# Patient Record
Sex: FEMALE | Race: WHITE | NOT HISPANIC OR LATINO | Employment: UNEMPLOYED | RURAL
[De-identification: names, ages, dates, MRNs, and addresses within clinical notes are randomized per-mention and may not be internally consistent; named-entity substitution may affect disease eponyms.]

---

## 2017-01-03 ENCOUNTER — OUTSIDE FACILITY SERVICE (OUTPATIENT)
Dept: FAMILY MEDICINE CLINIC | Facility: CLINIC | Age: 62
End: 2017-01-03

## 2017-01-03 PROCEDURE — 99308 SBSQ NF CARE LOW MDM 20: CPT | Performed by: FAMILY MEDICINE

## 2017-02-07 ENCOUNTER — OUTSIDE FACILITY SERVICE (OUTPATIENT)
Dept: FAMILY MEDICINE CLINIC | Facility: CLINIC | Age: 62
End: 2017-02-07

## 2017-02-07 PROCEDURE — 99308 SBSQ NF CARE LOW MDM 20: CPT | Performed by: FAMILY MEDICINE

## 2017-03-08 ENCOUNTER — OUTSIDE FACILITY SERVICE (OUTPATIENT)
Dept: FAMILY MEDICINE CLINIC | Facility: CLINIC | Age: 62
End: 2017-03-08

## 2017-03-08 PROCEDURE — 99308 SBSQ NF CARE LOW MDM 20: CPT | Performed by: NURSE PRACTITIONER

## 2017-04-04 ENCOUNTER — OUTSIDE FACILITY SERVICE (OUTPATIENT)
Dept: FAMILY MEDICINE CLINIC | Facility: CLINIC | Age: 62
End: 2017-04-04

## 2017-04-04 PROCEDURE — 99308 SBSQ NF CARE LOW MDM 20: CPT | Performed by: FAMILY MEDICINE

## 2017-05-03 ENCOUNTER — OUTSIDE FACILITY SERVICE (OUTPATIENT)
Dept: FAMILY MEDICINE CLINIC | Facility: CLINIC | Age: 62
End: 2017-05-03

## 2017-05-03 PROCEDURE — 99308 SBSQ NF CARE LOW MDM 20: CPT | Performed by: NURSE PRACTITIONER

## 2017-06-06 ENCOUNTER — OUTSIDE FACILITY SERVICE (OUTPATIENT)
Dept: FAMILY MEDICINE CLINIC | Facility: CLINIC | Age: 62
End: 2017-06-06

## 2017-06-06 PROCEDURE — 99308 SBSQ NF CARE LOW MDM 20: CPT | Performed by: FAMILY MEDICINE

## 2017-07-05 ENCOUNTER — OUTSIDE FACILITY SERVICE (OUTPATIENT)
Dept: FAMILY MEDICINE CLINIC | Facility: CLINIC | Age: 62
End: 2017-07-05

## 2017-07-05 PROCEDURE — 99308 SBSQ NF CARE LOW MDM 20: CPT | Performed by: NURSE PRACTITIONER

## 2017-08-01 ENCOUNTER — OUTSIDE FACILITY SERVICE (OUTPATIENT)
Dept: FAMILY MEDICINE CLINIC | Facility: CLINIC | Age: 62
End: 2017-08-01

## 2017-08-01 PROCEDURE — 99308 SBSQ NF CARE LOW MDM 20: CPT | Performed by: FAMILY MEDICINE

## 2017-09-05 ENCOUNTER — OUTSIDE FACILITY SERVICE (OUTPATIENT)
Dept: FAMILY MEDICINE CLINIC | Facility: CLINIC | Age: 62
End: 2017-09-05

## 2017-09-05 PROCEDURE — 99308 SBSQ NF CARE LOW MDM 20: CPT | Performed by: FAMILY MEDICINE

## 2017-10-04 ENCOUNTER — OUTSIDE FACILITY SERVICE (OUTPATIENT)
Dept: FAMILY MEDICINE CLINIC | Facility: CLINIC | Age: 62
End: 2017-10-04

## 2017-10-04 PROCEDURE — 99308 SBSQ NF CARE LOW MDM 20: CPT | Performed by: NURSE PRACTITIONER

## 2017-11-07 ENCOUNTER — OUTSIDE FACILITY SERVICE (OUTPATIENT)
Dept: FAMILY MEDICINE CLINIC | Facility: CLINIC | Age: 62
End: 2017-11-07

## 2017-11-07 PROCEDURE — 99308 SBSQ NF CARE LOW MDM 20: CPT | Performed by: FAMILY MEDICINE

## 2017-12-06 ENCOUNTER — OUTSIDE FACILITY SERVICE (OUTPATIENT)
Dept: FAMILY MEDICINE CLINIC | Facility: CLINIC | Age: 62
End: 2017-12-06

## 2017-12-06 PROCEDURE — 99308 SBSQ NF CARE LOW MDM 20: CPT | Performed by: NURSE PRACTITIONER

## 2018-01-09 ENCOUNTER — OUTSIDE FACILITY SERVICE (OUTPATIENT)
Dept: FAMILY MEDICINE CLINIC | Facility: CLINIC | Age: 63
End: 2018-01-09

## 2018-01-09 PROCEDURE — 99308 SBSQ NF CARE LOW MDM 20: CPT | Performed by: FAMILY MEDICINE

## 2018-02-07 ENCOUNTER — OUTSIDE FACILITY SERVICE (OUTPATIENT)
Dept: FAMILY MEDICINE CLINIC | Facility: CLINIC | Age: 63
End: 2018-02-07

## 2018-02-07 PROCEDURE — 99308 SBSQ NF CARE LOW MDM 20: CPT | Performed by: NURSE PRACTITIONER

## 2018-03-06 ENCOUNTER — OUTSIDE FACILITY SERVICE (OUTPATIENT)
Dept: FAMILY MEDICINE CLINIC | Facility: CLINIC | Age: 63
End: 2018-03-06

## 2018-03-06 PROCEDURE — 99308 SBSQ NF CARE LOW MDM 20: CPT | Performed by: FAMILY MEDICINE

## 2018-04-04 ENCOUNTER — OUTSIDE FACILITY SERVICE (OUTPATIENT)
Dept: FAMILY MEDICINE CLINIC | Facility: CLINIC | Age: 63
End: 2018-04-04

## 2018-04-04 PROCEDURE — 99308 SBSQ NF CARE LOW MDM 20: CPT | Performed by: NURSE PRACTITIONER

## 2018-05-01 ENCOUNTER — OUTSIDE FACILITY SERVICE (OUTPATIENT)
Dept: FAMILY MEDICINE CLINIC | Facility: CLINIC | Age: 63
End: 2018-05-01

## 2018-05-01 PROCEDURE — 99308 SBSQ NF CARE LOW MDM 20: CPT | Performed by: FAMILY MEDICINE

## 2018-06-05 ENCOUNTER — OUTSIDE FACILITY SERVICE (OUTPATIENT)
Dept: FAMILY MEDICINE CLINIC | Facility: CLINIC | Age: 63
End: 2018-06-05

## 2018-06-05 PROCEDURE — 99308 SBSQ NF CARE LOW MDM 20: CPT | Performed by: FAMILY MEDICINE

## 2018-06-21 ENCOUNTER — TELEPHONE (OUTPATIENT)
Dept: FAMILY MEDICINE CLINIC | Facility: CLINIC | Age: 63
End: 2018-06-21

## 2018-06-21 NOTE — TELEPHONE ENCOUNTER
Delma called stating Mrs Caal had a bug bite on her right upper thigh and is redden, swollen and painful to touch in the center was some pus, she was negative for fever and is has NKA. Requesting orders

## 2018-07-03 ENCOUNTER — OUTSIDE FACILITY SERVICE (OUTPATIENT)
Dept: FAMILY MEDICINE CLINIC | Facility: CLINIC | Age: 63
End: 2018-07-03

## 2018-07-03 PROCEDURE — 99308 SBSQ NF CARE LOW MDM 20: CPT | Performed by: FAMILY MEDICINE

## 2018-08-07 ENCOUNTER — OUTSIDE FACILITY SERVICE (OUTPATIENT)
Dept: FAMILY MEDICINE CLINIC | Facility: CLINIC | Age: 63
End: 2018-08-07

## 2018-08-07 PROCEDURE — 99308 SBSQ NF CARE LOW MDM 20: CPT | Performed by: FAMILY MEDICINE

## 2018-10-02 ENCOUNTER — OUTSIDE FACILITY SERVICE (OUTPATIENT)
Dept: FAMILY MEDICINE CLINIC | Facility: CLINIC | Age: 63
End: 2018-10-02

## 2018-10-02 PROCEDURE — 99308 SBSQ NF CARE LOW MDM 20: CPT | Performed by: FAMILY MEDICINE

## 2018-11-06 ENCOUNTER — OUTSIDE FACILITY SERVICE (OUTPATIENT)
Dept: FAMILY MEDICINE CLINIC | Facility: CLINIC | Age: 63
End: 2018-11-06

## 2018-11-06 PROCEDURE — 99308 SBSQ NF CARE LOW MDM 20: CPT | Performed by: FAMILY MEDICINE

## 2018-12-04 ENCOUNTER — OUTSIDE FACILITY SERVICE (OUTPATIENT)
Dept: FAMILY MEDICINE CLINIC | Facility: CLINIC | Age: 63
End: 2018-12-04

## 2018-12-04 PROCEDURE — 99309 SBSQ NF CARE MODERATE MDM 30: CPT | Performed by: FAMILY MEDICINE

## 2019-01-08 ENCOUNTER — OUTSIDE FACILITY SERVICE (OUTPATIENT)
Dept: FAMILY MEDICINE CLINIC | Facility: CLINIC | Age: 64
End: 2019-01-08

## 2019-01-08 PROCEDURE — 99308 SBSQ NF CARE LOW MDM 20: CPT | Performed by: FAMILY MEDICINE

## 2019-02-05 ENCOUNTER — OUTSIDE FACILITY SERVICE (OUTPATIENT)
Dept: FAMILY MEDICINE CLINIC | Facility: CLINIC | Age: 64
End: 2019-02-05

## 2019-02-05 PROCEDURE — 99308 SBSQ NF CARE LOW MDM 20: CPT | Performed by: FAMILY MEDICINE

## 2019-02-12 ENCOUNTER — OFFICE VISIT (OUTPATIENT)
Dept: SURGERY | Facility: CLINIC | Age: 64
End: 2019-02-12

## 2019-02-12 DIAGNOSIS — K94.20 GASTROSTOMY COMPLICATION (HCC): Primary | ICD-10-CM

## 2019-02-12 PROCEDURE — S0260 H&P FOR SURGERY: HCPCS | Performed by: SURGERY

## 2019-02-12 PROCEDURE — 43762 RPLC GTUBE NO REVJ TRC: CPT | Performed by: SURGERY

## 2019-02-12 NOTE — PROGRESS NOTES
Subjective   Bere Caal is a 63 y.o. female.     History of Present Illness She was sent from the nursing home apparently because she has leakage around her gastrostomy, and it needs changing. It is a balloon type.     The following portions of the patient's history were reviewed and updated as appropriate: current medications, past family history, past medical history, past social history, past surgical history and problem list.    Review of Systems unable to obtain from patient.    Objective   Physical Exam irritation around G tube. The old one was removed and a 26 fr one placed    Assessment/Plan   Bere was seen today for peg replacement.    Diagnoses and all orders for this visit:    Gastrostomy complication (CMS/[facility])    return prn, if this does not help the leakage she would have to have a jejunostomy tube

## 2019-03-05 ENCOUNTER — OUTSIDE FACILITY SERVICE (OUTPATIENT)
Dept: FAMILY MEDICINE CLINIC | Facility: CLINIC | Age: 64
End: 2019-03-05

## 2019-03-05 PROCEDURE — 99308 SBSQ NF CARE LOW MDM 20: CPT | Performed by: FAMILY MEDICINE

## 2019-04-02 ENCOUNTER — OUTSIDE FACILITY SERVICE (OUTPATIENT)
Dept: FAMILY MEDICINE CLINIC | Facility: CLINIC | Age: 64
End: 2019-04-02

## 2019-04-02 PROCEDURE — 99308 SBSQ NF CARE LOW MDM 20: CPT | Performed by: FAMILY MEDICINE

## 2019-05-07 ENCOUNTER — OUTSIDE FACILITY SERVICE (OUTPATIENT)
Dept: FAMILY MEDICINE CLINIC | Facility: CLINIC | Age: 64
End: 2019-05-07

## 2019-05-07 PROCEDURE — 99309 SBSQ NF CARE MODERATE MDM 30: CPT | Performed by: FAMILY MEDICINE

## 2019-06-04 ENCOUNTER — OUTSIDE FACILITY SERVICE (OUTPATIENT)
Dept: FAMILY MEDICINE CLINIC | Facility: CLINIC | Age: 64
End: 2019-06-04

## 2019-06-04 PROCEDURE — 99308 SBSQ NF CARE LOW MDM 20: CPT | Performed by: FAMILY MEDICINE

## 2019-07-09 ENCOUNTER — OUTSIDE FACILITY SERVICE (OUTPATIENT)
Dept: FAMILY MEDICINE CLINIC | Facility: CLINIC | Age: 64
End: 2019-07-09

## 2019-07-09 PROCEDURE — 99308 SBSQ NF CARE LOW MDM 20: CPT | Performed by: FAMILY MEDICINE

## 2019-08-06 ENCOUNTER — OUTSIDE FACILITY SERVICE (OUTPATIENT)
Dept: FAMILY MEDICINE CLINIC | Facility: CLINIC | Age: 64
End: 2019-08-06

## 2019-08-06 PROCEDURE — 99308 SBSQ NF CARE LOW MDM 20: CPT | Performed by: FAMILY MEDICINE

## 2019-10-01 ENCOUNTER — OUTSIDE FACILITY SERVICE (OUTPATIENT)
Dept: FAMILY MEDICINE CLINIC | Facility: CLINIC | Age: 64
End: 2019-10-01

## 2019-10-01 PROCEDURE — 99308 SBSQ NF CARE LOW MDM 20: CPT | Performed by: FAMILY MEDICINE

## 2019-11-05 ENCOUNTER — OUTSIDE FACILITY SERVICE (OUTPATIENT)
Dept: FAMILY MEDICINE CLINIC | Facility: CLINIC | Age: 64
End: 2019-11-05

## 2019-11-05 PROCEDURE — 99308 SBSQ NF CARE LOW MDM 20: CPT | Performed by: FAMILY MEDICINE

## 2019-12-03 ENCOUNTER — OUTSIDE FACILITY SERVICE (OUTPATIENT)
Dept: FAMILY MEDICINE CLINIC | Facility: CLINIC | Age: 64
End: 2019-12-03

## 2019-12-03 PROCEDURE — 99308 SBSQ NF CARE LOW MDM 20: CPT | Performed by: FAMILY MEDICINE

## 2020-01-07 ENCOUNTER — OUTSIDE FACILITY SERVICE (OUTPATIENT)
Dept: FAMILY MEDICINE CLINIC | Facility: CLINIC | Age: 65
End: 2020-01-07

## 2020-01-07 PROCEDURE — 99308 SBSQ NF CARE LOW MDM 20: CPT | Performed by: FAMILY MEDICINE

## 2020-02-04 ENCOUNTER — OUTSIDE FACILITY SERVICE (OUTPATIENT)
Dept: FAMILY MEDICINE CLINIC | Facility: CLINIC | Age: 65
End: 2020-02-04

## 2020-02-04 PROCEDURE — 99308 SBSQ NF CARE LOW MDM 20: CPT | Performed by: FAMILY MEDICINE

## 2020-02-26 ENCOUNTER — TRANSCRIBE ORDERS (OUTPATIENT)
Dept: ADMINISTRATIVE | Facility: HOSPITAL | Age: 65
End: 2020-02-26

## 2020-02-26 DIAGNOSIS — R63.8 INADEQUATE ORAL INTAKE: Primary | ICD-10-CM

## 2020-02-26 DIAGNOSIS — Z91.89 AT RISK FOR INADEQUATE ORAL INTAKE: ICD-10-CM

## 2020-02-26 DIAGNOSIS — R13.19 OTHER DYSPHAGIA: ICD-10-CM

## 2020-03-03 ENCOUNTER — OUTSIDE FACILITY SERVICE (OUTPATIENT)
Dept: FAMILY MEDICINE CLINIC | Facility: CLINIC | Age: 65
End: 2020-03-03

## 2020-03-03 PROCEDURE — 99308 SBSQ NF CARE LOW MDM 20: CPT | Performed by: FAMILY MEDICINE

## 2020-03-05 ENCOUNTER — APPOINTMENT (OUTPATIENT)
Dept: CT IMAGING | Facility: HOSPITAL | Age: 65
End: 2020-03-05

## 2020-03-05 ENCOUNTER — APPOINTMENT (OUTPATIENT)
Dept: GENERAL RADIOLOGY | Facility: HOSPITAL | Age: 65
End: 2020-03-05

## 2020-03-05 ENCOUNTER — HOSPITAL ENCOUNTER (INPATIENT)
Facility: HOSPITAL | Age: 65
LOS: 6 days | Discharge: NURSING, REHAB OR LONG-TERM CARE | End: 2020-03-11
Attending: EMERGENCY MEDICINE | Admitting: HOSPITALIST

## 2020-03-05 DIAGNOSIS — S72.22XA CLOSED DISPLACED SUBTROCHANTERIC FRACTURE OF LEFT FEMUR, INITIAL ENCOUNTER (HCC): Primary | ICD-10-CM

## 2020-03-05 DIAGNOSIS — N30.00 ACUTE CYSTITIS WITHOUT HEMATURIA: ICD-10-CM

## 2020-03-05 LAB
ABO GROUP BLD: NORMAL
ABO GROUP BLD: NORMAL
ALBUMIN SERPL-MCNC: 2.8 G/DL (ref 3.5–5.2)
ALBUMIN/GLOB SERPL: 0.7 G/DL
ALP SERPL-CCNC: 248 U/L (ref 39–117)
ALT SERPL W P-5'-P-CCNC: 47 U/L (ref 1–33)
AMMONIA BLD-SCNC: 75 UMOL/L (ref 11–51)
ANION GAP SERPL CALCULATED.3IONS-SCNC: 12 MMOL/L (ref 5–15)
APAP SERPL-MCNC: <5 MCG/ML (ref 10–30)
AST SERPL-CCNC: 180 U/L (ref 1–32)
BACTERIA UR QL AUTO: ABNORMAL /HPF
BASOPHILS # BLD AUTO: 0.02 10*3/MM3 (ref 0–0.2)
BASOPHILS NFR BLD AUTO: 0.5 % (ref 0–1.5)
BILIRUB SERPL-MCNC: 0.6 MG/DL (ref 0.2–1.2)
BILIRUB UR QL STRIP: ABNORMAL
BLD GP AB SCN SERPL QL: NEGATIVE
BUN BLD-MCNC: 13 MG/DL (ref 8–23)
BUN/CREAT SERPL: 30.2 (ref 7–25)
CALCIUM SPEC-SCNC: 8.7 MG/DL (ref 8.6–10.5)
CHLORIDE SERPL-SCNC: 100 MMOL/L (ref 98–107)
CK SERPL-CCNC: 166 U/L (ref 20–180)
CLARITY UR: ABNORMAL
CO2 SERPL-SCNC: 25 MMOL/L (ref 22–29)
COD CRY URNS QL: ABNORMAL /HPF
COLOR UR: ABNORMAL
CREAT BLD-MCNC: 0.43 MG/DL (ref 0.57–1)
CRP SERPL-MCNC: 7.46 MG/DL (ref 0–0.5)
D-LACTATE SERPL-SCNC: 1.2 MMOL/L (ref 0.5–2)
DEPRECATED RDW RBC AUTO: 55 FL (ref 37–54)
EOSINOPHIL # BLD AUTO: 0.02 10*3/MM3 (ref 0–0.4)
EOSINOPHIL NFR BLD AUTO: 0.5 % (ref 0.3–6.2)
ERYTHROCYTE [DISTWIDTH] IN BLOOD BY AUTOMATED COUNT: 17.7 % (ref 12.3–15.4)
ERYTHROCYTE [SEDIMENTATION RATE] IN BLOOD: 62 MM/HR (ref 0–30)
FLUAV AG NPH QL: NEGATIVE
FLUBV AG NPH QL IA: NEGATIVE
GFR SERPL CREATININE-BSD FRML MDRD: 149 ML/MIN/1.73
GLOBULIN UR ELPH-MCNC: 4.1 GM/DL
GLUCOSE BLD-MCNC: 115 MG/DL (ref 65–99)
GLUCOSE BLDC GLUCOMTR-MCNC: 127 MG/DL (ref 70–130)
GLUCOSE BLDC GLUCOMTR-MCNC: 74 MG/DL (ref 70–130)
GLUCOSE BLDC GLUCOMTR-MCNC: 93 MG/DL (ref 70–130)
GLUCOSE UR STRIP-MCNC: NEGATIVE MG/DL
HAV IGM SERPL QL IA: NORMAL
HBV CORE IGM SERPL QL IA: NORMAL
HBV SURFACE AG SERPL QL IA: NORMAL
HCT VFR BLD AUTO: 30.9 % (ref 34–46.6)
HCT VFR BLD AUTO: 33 % (ref 34–46.6)
HCV AB SER DONR QL: NORMAL
HGB BLD-MCNC: 10.4 G/DL (ref 12–15.9)
HGB BLD-MCNC: 9.7 G/DL (ref 12–15.9)
HGB UR QL STRIP.AUTO: ABNORMAL
HOLD SPECIMEN: NORMAL
HYALINE CASTS UR QL AUTO: ABNORMAL /LPF
IMM GRANULOCYTES # BLD AUTO: 0.02 10*3/MM3 (ref 0–0.05)
IMM GRANULOCYTES NFR BLD AUTO: 0.5 % (ref 0–0.5)
INR PPP: 1.12 (ref 0.9–1.1)
KETONES UR QL STRIP: ABNORMAL
LEUKOCYTE ESTERASE UR QL STRIP.AUTO: ABNORMAL
LIPASE SERPL-CCNC: 35 U/L (ref 13–60)
LYMPHOCYTES # BLD AUTO: 1.06 10*3/MM3 (ref 0.7–3.1)
LYMPHOCYTES NFR BLD AUTO: 24.5 % (ref 19.6–45.3)
MAGNESIUM SERPL-MCNC: 1.6 MG/DL (ref 1.6–2.4)
MCH RBC QN AUTO: 26.5 PG (ref 26.6–33)
MCHC RBC AUTO-ENTMCNC: 31.4 G/DL (ref 31.5–35.7)
MCV RBC AUTO: 84.4 FL (ref 79–97)
MONOCYTES # BLD AUTO: 0.5 10*3/MM3 (ref 0.1–0.9)
MONOCYTES NFR BLD AUTO: 11.6 % (ref 5–12)
NEUTROPHILS # BLD AUTO: 2.7 10*3/MM3 (ref 1.7–7)
NEUTROPHILS NFR BLD AUTO: 62.4 % (ref 42.7–76)
NITRITE UR QL STRIP: POSITIVE
NRBC BLD AUTO-RTO: 0 /100 WBC (ref 0–0.2)
PH UR STRIP.AUTO: 6.5 [PH] (ref 5–8)
PHOSPHATE SERPL-MCNC: 2.8 MG/DL (ref 2.5–4.5)
PLATELET # BLD AUTO: 153 10*3/MM3 (ref 140–450)
PMV BLD AUTO: 10 FL (ref 6–12)
POTASSIUM BLD-SCNC: 4.1 MMOL/L (ref 3.5–5.2)
PROT SERPL-MCNC: 6.9 G/DL (ref 6–8.5)
PROT UR QL STRIP: ABNORMAL
PROTHROMBIN TIME: 15 SECONDS (ref 11–15.4)
RBC # BLD AUTO: 3.66 10*6/MM3 (ref 3.77–5.28)
RBC # UR: ABNORMAL /HPF
REF LAB TEST METHOD: ABNORMAL
RH BLD: POSITIVE
RH BLD: POSITIVE
SODIUM BLD-SCNC: 137 MMOL/L (ref 136–145)
SP GR UR STRIP: 1.02 (ref 1–1.03)
SQUAMOUS #/AREA URNS HPF: ABNORMAL /HPF
T&S EXPIRATION DATE: NORMAL
T4 FREE SERPL-MCNC: 1.56 NG/DL (ref 0.93–1.7)
TROPONIN T SERPL-MCNC: <0.01 NG/ML (ref 0–0.03)
TSH SERPL DL<=0.05 MIU/L-ACNC: 2.15 UIU/ML (ref 0.27–4.2)
UROBILINOGEN UR QL STRIP: ABNORMAL
WBC NRBC COR # BLD: 4.32 10*3/MM3 (ref 3.4–10.8)
WBC UR QL AUTO: ABNORMAL /HPF

## 2020-03-05 PROCEDURE — 86901 BLOOD TYPING SEROLOGIC RH(D): CPT | Performed by: EMERGENCY MEDICINE

## 2020-03-05 PROCEDURE — 82306 VITAMIN D 25 HYDROXY: CPT | Performed by: NURSE PRACTITIONER

## 2020-03-05 PROCEDURE — 25010000002 DIGOXIN PER 500 MCG: Performed by: HOSPITALIST

## 2020-03-05 PROCEDURE — 25010000002 ONDANSETRON PER 1 MG: Performed by: EMERGENCY MEDICINE

## 2020-03-05 PROCEDURE — 25010000002 CEFTRIAXONE: Performed by: EMERGENCY MEDICINE

## 2020-03-05 PROCEDURE — 87086 URINE CULTURE/COLONY COUNT: CPT | Performed by: EMERGENCY MEDICINE

## 2020-03-05 PROCEDURE — 70450 CT HEAD/BRAIN W/O DYE: CPT

## 2020-03-05 PROCEDURE — 80307 DRUG TEST PRSMV CHEM ANLYZR: CPT | Performed by: NURSE PRACTITIONER

## 2020-03-05 PROCEDURE — 80177 DRUG SCRN QUAN LEVETIRACETAM: CPT | Performed by: NURSE PRACTITIONER

## 2020-03-05 PROCEDURE — 92610 EVALUATE SWALLOWING FUNCTION: CPT | Performed by: SPEECH-LANGUAGE PATHOLOGIST

## 2020-03-05 PROCEDURE — 83690 ASSAY OF LIPASE: CPT | Performed by: HOSPITALIST

## 2020-03-05 PROCEDURE — 83735 ASSAY OF MAGNESIUM: CPT | Performed by: EMERGENCY MEDICINE

## 2020-03-05 PROCEDURE — 86901 BLOOD TYPING SEROLOGIC RH(D): CPT

## 2020-03-05 PROCEDURE — 84439 ASSAY OF FREE THYROXINE: CPT | Performed by: EMERGENCY MEDICINE

## 2020-03-05 PROCEDURE — 82746 ASSAY OF FOLIC ACID SERUM: CPT | Performed by: HOSPITALIST

## 2020-03-05 PROCEDURE — 72192 CT PELVIS W/O DYE: CPT

## 2020-03-05 PROCEDURE — 80053 COMPREHEN METABOLIC PANEL: CPT | Performed by: EMERGENCY MEDICINE

## 2020-03-05 PROCEDURE — 25010000002 MORPHINE PER 10 MG: Performed by: EMERGENCY MEDICINE

## 2020-03-05 PROCEDURE — 85652 RBC SED RATE AUTOMATED: CPT | Performed by: EMERGENCY MEDICINE

## 2020-03-05 PROCEDURE — 87088 URINE BACTERIA CULTURE: CPT | Performed by: EMERGENCY MEDICINE

## 2020-03-05 PROCEDURE — 36415 COLL VENOUS BLD VENIPUNCTURE: CPT

## 2020-03-05 PROCEDURE — 87804 INFLUENZA ASSAY W/OPTIC: CPT | Performed by: EMERGENCY MEDICINE

## 2020-03-05 PROCEDURE — 93005 ELECTROCARDIOGRAM TRACING: CPT | Performed by: NURSE PRACTITIONER

## 2020-03-05 PROCEDURE — 25010000002 LEVETRIRACETAM PER 10 MG: Performed by: HOSPITALIST

## 2020-03-05 PROCEDURE — 84443 ASSAY THYROID STIM HORMONE: CPT | Performed by: EMERGENCY MEDICINE

## 2020-03-05 PROCEDURE — 82962 GLUCOSE BLOOD TEST: CPT

## 2020-03-05 PROCEDURE — 25010000003 MAGNESIUM SULFATE 4 GM/100ML SOLUTION: Performed by: HOSPITALIST

## 2020-03-05 PROCEDURE — 83605 ASSAY OF LACTIC ACID: CPT | Performed by: EMERGENCY MEDICINE

## 2020-03-05 PROCEDURE — P9612 CATHETERIZE FOR URINE SPEC: HCPCS

## 2020-03-05 PROCEDURE — 87341 HEP B SURFACE AG NEUTRLZJ IA: CPT | Performed by: NURSE PRACTITIONER

## 2020-03-05 PROCEDURE — 85610 PROTHROMBIN TIME: CPT | Performed by: NURSE PRACTITIONER

## 2020-03-05 PROCEDURE — 86850 RBC ANTIBODY SCREEN: CPT | Performed by: EMERGENCY MEDICINE

## 2020-03-05 PROCEDURE — 73700 CT LOWER EXTREMITY W/O DYE: CPT

## 2020-03-05 PROCEDURE — 82140 ASSAY OF AMMONIA: CPT | Performed by: HOSPITALIST

## 2020-03-05 PROCEDURE — 85018 HEMOGLOBIN: CPT | Performed by: NURSE PRACTITIONER

## 2020-03-05 PROCEDURE — 99284 EMERGENCY DEPT VISIT MOD MDM: CPT

## 2020-03-05 PROCEDURE — 99223 1ST HOSP IP/OBS HIGH 75: CPT | Performed by: NURSE PRACTITIONER

## 2020-03-05 PROCEDURE — 87040 BLOOD CULTURE FOR BACTERIA: CPT | Performed by: EMERGENCY MEDICINE

## 2020-03-05 PROCEDURE — 93010 ELECTROCARDIOGRAM REPORT: CPT | Performed by: INTERNAL MEDICINE

## 2020-03-05 PROCEDURE — 84100 ASSAY OF PHOSPHORUS: CPT | Performed by: EMERGENCY MEDICINE

## 2020-03-05 PROCEDURE — 70450 CT HEAD/BRAIN W/O DYE: CPT | Performed by: RADIOLOGY

## 2020-03-05 PROCEDURE — 81001 URINALYSIS AUTO W/SCOPE: CPT | Performed by: EMERGENCY MEDICINE

## 2020-03-05 PROCEDURE — 85025 COMPLETE CBC W/AUTO DIFF WBC: CPT | Performed by: EMERGENCY MEDICINE

## 2020-03-05 PROCEDURE — 25010000002 MORPHINE PER 10 MG: Performed by: HOSPITALIST

## 2020-03-05 PROCEDURE — 82607 VITAMIN B-12: CPT | Performed by: HOSPITALIST

## 2020-03-05 PROCEDURE — 86706 HEP B SURFACE ANTIBODY: CPT | Performed by: HOSPITALIST

## 2020-03-05 PROCEDURE — 82550 ASSAY OF CK (CPK): CPT | Performed by: EMERGENCY MEDICINE

## 2020-03-05 PROCEDURE — 80074 ACUTE HEPATITIS PANEL: CPT | Performed by: NURSE PRACTITIONER

## 2020-03-05 PROCEDURE — 86900 BLOOD TYPING SEROLOGIC ABO: CPT | Performed by: EMERGENCY MEDICINE

## 2020-03-05 PROCEDURE — 94799 UNLISTED PULMONARY SVC/PX: CPT

## 2020-03-05 PROCEDURE — 84484 ASSAY OF TROPONIN QUANT: CPT | Performed by: EMERGENCY MEDICINE

## 2020-03-05 PROCEDURE — 85014 HEMATOCRIT: CPT | Performed by: NURSE PRACTITIONER

## 2020-03-05 PROCEDURE — 87186 SC STD MICRODIL/AGAR DIL: CPT | Performed by: EMERGENCY MEDICINE

## 2020-03-05 PROCEDURE — 86140 C-REACTIVE PROTEIN: CPT | Performed by: EMERGENCY MEDICINE

## 2020-03-05 PROCEDURE — 86900 BLOOD TYPING SEROLOGIC ABO: CPT

## 2020-03-05 RX ORDER — LEVETIRACETAM 100 MG/ML
250 SOLUTION ORAL EVERY 12 HOURS SCHEDULED
Status: CANCELLED | OUTPATIENT
Start: 2020-03-05

## 2020-03-05 RX ORDER — LEVETIRACETAM 100 MG/ML
250 SOLUTION ORAL 2 TIMES DAILY
COMMUNITY

## 2020-03-05 RX ORDER — TRAZODONE HYDROCHLORIDE 50 MG/1
50 TABLET ORAL NIGHTLY
Status: CANCELLED | OUTPATIENT
Start: 2020-03-05

## 2020-03-05 RX ORDER — MULTIVITAMIN
1 TABLET ORAL DAILY
Status: CANCELLED | OUTPATIENT
Start: 2020-03-05

## 2020-03-05 RX ORDER — CARVEDILOL 6.25 MG/1
6.25 TABLET ORAL EVERY 12 HOURS
Status: CANCELLED | OUTPATIENT
Start: 2020-03-05

## 2020-03-05 RX ORDER — MORPHINE SULFATE 2 MG/ML
1 INJECTION, SOLUTION INTRAMUSCULAR; INTRAVENOUS EVERY 4 HOURS PRN
Status: DISCONTINUED | OUTPATIENT
Start: 2020-03-05 | End: 2020-03-11 | Stop reason: HOSPADM

## 2020-03-05 RX ORDER — LISINOPRIL 10 MG/1
20 TABLET ORAL DAILY
Status: CANCELLED | OUTPATIENT
Start: 2020-03-05

## 2020-03-05 RX ORDER — LACTULOSE 10 G/15ML
300 SOLUTION ORAL ONCE
Status: COMPLETED | OUTPATIENT
Start: 2020-03-05 | End: 2020-03-06

## 2020-03-05 RX ORDER — SODIUM CHLORIDE 9 MG/ML
100 INJECTION, SOLUTION INTRAVENOUS CONTINUOUS
Status: DISCONTINUED | OUTPATIENT
Start: 2020-03-05 | End: 2020-03-05

## 2020-03-05 RX ORDER — PRIMIDONE 250 MG/1
250 TABLET ORAL 3 TIMES DAILY
Status: CANCELLED | OUTPATIENT
Start: 2020-03-05

## 2020-03-05 RX ORDER — PRIMIDONE 250 MG/1
250 TABLET ORAL 3 TIMES DAILY
COMMUNITY

## 2020-03-05 RX ORDER — DILTIAZEM HYDROCHLORIDE 5 MG/ML
5 INJECTION INTRAVENOUS ONCE
Status: COMPLETED | OUTPATIENT
Start: 2020-03-05 | End: 2020-03-05

## 2020-03-05 RX ORDER — PRIMIDONE 250 MG/1
250 TABLET ORAL 3 TIMES DAILY
Status: DISCONTINUED | OUTPATIENT
Start: 2020-03-05 | End: 2020-03-11 | Stop reason: HOSPADM

## 2020-03-05 RX ORDER — NITROGLYCERIN 0.4 MG/1
0.4 TABLET SUBLINGUAL
Status: DISCONTINUED | OUTPATIENT
Start: 2020-03-05 | End: 2020-03-11 | Stop reason: HOSPADM

## 2020-03-05 RX ORDER — SODIUM CHLORIDE 0.9 % (FLUSH) 0.9 %
10 SYRINGE (ML) INJECTION AS NEEDED
Status: DISCONTINUED | OUTPATIENT
Start: 2020-03-05 | End: 2020-03-11 | Stop reason: HOSPADM

## 2020-03-05 RX ORDER — NICOTINE POLACRILEX 4 MG
15 LOZENGE BUCCAL
Status: DISCONTINUED | OUTPATIENT
Start: 2020-03-05 | End: 2020-03-11 | Stop reason: HOSPADM

## 2020-03-05 RX ORDER — DEXTROSE AND SODIUM CHLORIDE 5; .9 G/100ML; G/100ML
50 INJECTION, SOLUTION INTRAVENOUS CONTINUOUS
Status: DISCONTINUED | OUTPATIENT
Start: 2020-03-05 | End: 2020-03-08

## 2020-03-05 RX ORDER — PAROXETINE 10 MG/1
10 TABLET, FILM COATED ORAL EVERY MORNING
COMMUNITY

## 2020-03-05 RX ORDER — FOLIC ACID 1 MG/1
1 TABLET ORAL DAILY
Status: DISCONTINUED | OUTPATIENT
Start: 2020-03-05 | End: 2020-03-11 | Stop reason: HOSPADM

## 2020-03-05 RX ORDER — FOLIC ACID 1 MG/1
1 TABLET ORAL DAILY
Status: CANCELLED | OUTPATIENT
Start: 2020-03-05

## 2020-03-05 RX ORDER — HYOSCYAMINE SULFATE 0.125 MG
125 TABLET ORAL NIGHTLY
Status: CANCELLED | OUTPATIENT
Start: 2020-03-05

## 2020-03-05 RX ORDER — SENNA PLUS 8.6 MG/1
1 TABLET ORAL 2 TIMES DAILY PRN
Status: CANCELLED | OUTPATIENT
Start: 2020-03-05

## 2020-03-05 RX ORDER — TRAZODONE HYDROCHLORIDE 50 MG/1
50 TABLET ORAL NIGHTLY
COMMUNITY
End: 2020-03-11 | Stop reason: HOSPADM

## 2020-03-05 RX ORDER — DEXTROSE MONOHYDRATE 25 G/50ML
25 INJECTION, SOLUTION INTRAVENOUS
Status: DISCONTINUED | OUTPATIENT
Start: 2020-03-05 | End: 2020-03-11 | Stop reason: HOSPADM

## 2020-03-05 RX ORDER — AMOXICILLIN 250 MG
2 CAPSULE ORAL 2 TIMES DAILY PRN
Status: DISCONTINUED | OUTPATIENT
Start: 2020-03-05 | End: 2020-03-11 | Stop reason: HOSPADM

## 2020-03-05 RX ORDER — TRAZODONE HYDROCHLORIDE 50 MG/1
50 TABLET ORAL NIGHTLY
Status: DISCONTINUED | OUTPATIENT
Start: 2020-03-05 | End: 2020-03-05

## 2020-03-05 RX ORDER — SODIUM CHLORIDE 0.9 % (FLUSH) 0.9 %
10 SYRINGE (ML) INJECTION EVERY 12 HOURS SCHEDULED
Status: DISCONTINUED | OUTPATIENT
Start: 2020-03-05 | End: 2020-03-11 | Stop reason: HOSPADM

## 2020-03-05 RX ORDER — DOCUSATE SODIUM 50 MG/5 ML
100 LIQUID (ML) ORAL 2 TIMES DAILY PRN
COMMUNITY

## 2020-03-05 RX ORDER — ONDANSETRON 2 MG/ML
4 INJECTION INTRAMUSCULAR; INTRAVENOUS ONCE
Status: COMPLETED | OUTPATIENT
Start: 2020-03-05 | End: 2020-03-05

## 2020-03-05 RX ORDER — HYOSCYAMINE SULFATE 0.125 MG
0.12 TABLET,DISINTEGRATING ORAL NIGHTLY
COMMUNITY
End: 2020-03-11 | Stop reason: HOSPADM

## 2020-03-05 RX ORDER — CARVEDILOL 6.25 MG/1
6.25 TABLET ORAL EVERY 12 HOURS
COMMUNITY
End: 2020-03-11 | Stop reason: HOSPADM

## 2020-03-05 RX ORDER — ACETAMINOPHEN 500 MG
500 TABLET ORAL EVERY 4 HOURS PRN
Status: CANCELLED | OUTPATIENT
Start: 2020-03-05

## 2020-03-05 RX ORDER — MAGNESIUM SULFATE HEPTAHYDRATE 40 MG/ML
4 INJECTION, SOLUTION INTRAVENOUS ONCE
Status: COMPLETED | OUTPATIENT
Start: 2020-03-05 | End: 2020-03-05

## 2020-03-05 RX ORDER — PAROXETINE HYDROCHLORIDE 20 MG/1
10 TABLET, FILM COATED ORAL EVERY MORNING
Status: DISCONTINUED | OUTPATIENT
Start: 2020-03-06 | End: 2020-03-11 | Stop reason: HOSPADM

## 2020-03-05 RX ORDER — PANTOPRAZOLE SODIUM 40 MG/1
40 TABLET, DELAYED RELEASE ORAL EVERY MORNING
Status: CANCELLED | OUTPATIENT
Start: 2020-03-05

## 2020-03-05 RX ORDER — ACETAMINOPHEN 650 MG/1
650 SUPPOSITORY RECTAL EVERY 4 HOURS PRN
Status: DISCONTINUED | OUTPATIENT
Start: 2020-03-05 | End: 2020-03-05

## 2020-03-05 RX ORDER — MORPHINE SULFATE 2 MG/ML
2 INJECTION, SOLUTION INTRAMUSCULAR; INTRAVENOUS ONCE
Status: COMPLETED | OUTPATIENT
Start: 2020-03-05 | End: 2020-03-05

## 2020-03-05 RX ORDER — LANSOPRAZOLE
30 KIT EVERY MORNING
Status: CANCELLED | OUTPATIENT
Start: 2020-03-05

## 2020-03-05 RX ORDER — DOCUSATE SODIUM 50 MG/5 ML
100 LIQUID (ML) ORAL 2 TIMES DAILY
Status: CANCELLED | OUTPATIENT
Start: 2020-03-05

## 2020-03-05 RX ORDER — FOLIC ACID 1 MG/1
1 TABLET ORAL DAILY
COMMUNITY

## 2020-03-05 RX ORDER — PAROXETINE HYDROCHLORIDE 20 MG/1
10 TABLET, FILM COATED ORAL EVERY MORNING
Status: CANCELLED | OUTPATIENT
Start: 2020-03-05

## 2020-03-05 RX ORDER — L.ACID,PARA/B.BIFIDUM/S.THERM 8B CELL
1 CAPSULE ORAL DAILY
Status: DISCONTINUED | OUTPATIENT
Start: 2020-03-05 | End: 2020-03-11 | Stop reason: HOSPADM

## 2020-03-05 RX ORDER — DIGOXIN 0.25 MG/ML
250 INJECTION INTRAMUSCULAR; INTRAVENOUS ONCE
Status: COMPLETED | OUTPATIENT
Start: 2020-03-05 | End: 2020-03-05

## 2020-03-05 RX ORDER — PRIMIDONE 250 MG/1
250 TABLET ORAL EVERY 8 HOURS SCHEDULED
Status: CANCELLED | OUTPATIENT
Start: 2020-03-05

## 2020-03-05 RX ORDER — NALOXONE HCL 0.4 MG/ML
0.4 VIAL (ML) INJECTION
Status: DISCONTINUED | OUTPATIENT
Start: 2020-03-05 | End: 2020-03-11 | Stop reason: HOSPADM

## 2020-03-05 RX ORDER — UREA 10 %
1 LOTION (ML) TOPICAL DAILY
Status: CANCELLED | OUTPATIENT
Start: 2020-03-05

## 2020-03-05 RX ORDER — ACETAMINOPHEN 500 MG
500 TABLET ORAL EVERY 4 HOURS PRN
COMMUNITY
End: 2020-03-11 | Stop reason: HOSPADM

## 2020-03-05 RX ORDER — LISINOPRIL 20 MG/1
20 TABLET ORAL DAILY
COMMUNITY
End: 2020-03-11 | Stop reason: HOSPADM

## 2020-03-05 RX ADMIN — SODIUM CHLORIDE, PRESERVATIVE FREE 10 ML: 5 INJECTION INTRAVENOUS at 09:02

## 2020-03-05 RX ADMIN — DIGOXIN 250 MCG: 0.25 INJECTION INTRAMUSCULAR; INTRAVENOUS at 11:22

## 2020-03-05 RX ADMIN — HYOSCYAMINE SULFATE 125 MCG: 0.12 TABLET, ORALLY DISINTEGRATING ORAL at 22:04

## 2020-03-05 RX ADMIN — DILTIAZEM HYDROCHLORIDE 5 MG: 5 INJECTION INTRAVENOUS at 13:20

## 2020-03-05 RX ADMIN — METRONIDAZOLE 500 MG: 500 INJECTION, SOLUTION INTRAVENOUS at 22:57

## 2020-03-05 RX ADMIN — MORPHINE SULFATE 2 MG: 2 INJECTION, SOLUTION INTRAMUSCULAR; INTRAVENOUS at 04:59

## 2020-03-05 RX ADMIN — ONDANSETRON 4 MG: 2 INJECTION INTRAMUSCULAR; INTRAVENOUS at 04:59

## 2020-03-05 RX ADMIN — MORPHINE SULFATE 1 MG: 2 INJECTION, SOLUTION INTRAMUSCULAR; INTRAVENOUS at 09:02

## 2020-03-05 RX ADMIN — DEXTROSE AND SODIUM CHLORIDE 100 ML/HR: 5; 900 INJECTION, SOLUTION INTRAVENOUS at 17:40

## 2020-03-05 RX ADMIN — CEFTRIAXONE 2 G: 2 INJECTION, POWDER, FOR SOLUTION INTRAMUSCULAR; INTRAVENOUS at 06:17

## 2020-03-05 RX ADMIN — MAGNESIUM SULFATE IN WATER 4 G: 40 INJECTION, SOLUTION INTRAVENOUS at 11:22

## 2020-03-05 RX ADMIN — SODIUM CHLORIDE 250 MG: 9 INJECTION, SOLUTION INTRAVENOUS at 22:04

## 2020-03-05 RX ADMIN — SODIUM CHLORIDE 100 ML/HR: 9 INJECTION, SOLUTION INTRAVENOUS at 09:02

## 2020-03-05 NOTE — PLAN OF CARE
Consult received. Chart reviewed. Pt has significant h/o dysphagia w/ previous PEG tube placement. Contacted RN Katy via telephone who d/w [facility] to defer SLP evaluation until pt is seen by ortho. SLP will f/u for pt status to participate pending clearance from MD. Continue NPO status w/ strict oral care. [place] aspiration precautions. BROOK precautions.    Thank you for allowing me to participate in the care of your patient-  Nicki Leiva M.S., CCC-SLP

## 2020-03-05 NOTE — ED NOTES
Cleaned pt with periwipes, placed hutton catheter with help of TAHIRA Jose RN and Dr. Peña at this time. Brief on pt was saturated with urine at this time. Pt has no redness noted to bottom Placed IV in right hand and got lab work. Pt tolerated well         Annie Mcgowan RN  03/05/20 0594

## 2020-03-05 NOTE — PROGRESS NOTES
Discharge Planning Assessment  [facility]     Patient Name: Bere Caal  MRN: [id number]  Today's Date: 3/5/2020    Admit Date: 3/5/2020    Discharge Needs Assessment     Row Name 03/05/20 1622       Discharge Needs Assessment    Discharge Facility/Level of Care Needs  nursing facility, skilled SS contacted [facility] [phone] per Barbie who states pt has a 14 day skilled bed hold.         Discharge Plan     Row Name 03/05/20 1623       Plan    Plan SS contacted [facility] [phone] per Barbie who states pt has a 14 day skilled bed hold. State Guardian is Татьяна Chavis and she will need to be notified at discharge. SS to follow and assist as needed with discharge planning.         Destination      Service Provider Request Status Selected Services Address Phone Number Fax Number    [facility] Pending - No Request Sent N/A [address] [place] [id number] [phone]     Demographic Summary     Row Name 03/05/20 1622       General Information    Referral Source  nursing    Reason for Consult  -- SS received consult pt from nursing home. Pt was admitted from [facility].      JOHANNY Mayes

## 2020-03-05 NOTE — ED NOTES
"Called [facility] at this time and pt's nurse (Hazel) explained at this time; \"the second shift nurses had noticed her screaming but she screams all the time and they didn't think anything of it. When the third shift CNA's were putting her to bed they noticed when her left leg was moved she would scream, and it was a different scream so they got me (aHzel) and when I looked at Bere's leg it was a little swollen. I called the doctor and he didn't answer so I left a voicemail. When the doctor called me back he said the pt should come on to the ER.\"      Annie Mcgowan, RN  03/05/20 4769    "

## 2020-03-05 NOTE — ED NOTES
Spoke with patient's nurse (Hazel) at [facility]; nurse states that patient was up in a geriatric chair and nurse aids transferred patient from chair to bed, upon transferring patient from chair to bed, aides attempted complete transfer by turning patient's legs into the bed; nurse states that patient screamed after aides moved her legs. Nurse states that patient screams all the time, but the scream that was let out during transfer was different than usual. Nurse stated that nurse aids reported that the patient's right leg appeared to be swollen. Nurse states that she assessed patient's leg and agreed that it appeared to be swollen. Nurse stated that she was not in the room when incident occurred so she is not exactly sure what specific circumstance could have caused the patient to injure her leg.     Enedelia Lowe RN  03/05/20 0239

## 2020-03-05 NOTE — CONSULTS
Consults    Patient Identification:  Name:  Bere Caal  Age:  64 y.o.  Sex:  female  :  1955  MRN:  [id number]  Visit Number:  [id number]  Primary care provider:  Kris Garcia MD    History of presenting illness:   This is a very unfortunate 64-year-old female who presented to [facility].  Patient is nonverbal and cannot give any medical history most of the medical history was obtained was from the chart as well as other medical providers within the hospital.  According to the chart the patient was being cared for in a nursing home facility, at which point she was being changed and a popping sensation was noted to the left lower extremity.  Patient was brought to [facility] ER in which a left displaced femur fracture was noted.  Patient is nonverbal.  She has history of traumatic intracranial hemorrhage.  Patient is a leyva of the [facility].  No family is at the bedside to assist in her medical history.  Patient is unable to assist with any medical history and review of systems.  Patient is chronically anticoagulated for atrial fib.  She is on Eliquis.  Last dose was administered last night.      Review of Systems:     Unable to obtain.  ---------------------------------------------------------------------------------------------------------------------  ---------------------------------------------------------------------------------------------------------------------   Past History:  History reviewed. No pertinent family history.  Past Medical History:   Diagnosis Date   • Aphasia    • Atrial fibrillation (CMS/HCC)    • Dysphagia    • Hemiplegia (CMS/HCC)    • Hypertension    • Seizures (CMS/HCC)    • Traumatic intracerebral hemorrhage (CMS/HCC)      History reviewed. No pertinent surgical history.  Social History     Socioeconomic History   • Marital status: Single     Spouse name: Not on file   • Number of children: Not on file   • Years of education:  Not on file   • Highest education level: Not on file   Tobacco Use   • Smoking status: Unknown If Ever Smoked   Substance and Sexual Activity   • Sexual activity: Defer     ---------------------------------------------------------------------------------------------------------------------   Allergies:  Patient has no known allergies.  ---------------------------------------------------------------------------------------------------------------------   Prior to Admission Medications     Prescriptions Last Dose Informant Patient Reported? Taking?    apixaban (ELIQUIS) 5 MG tablet tablet 3/4/2020 Nursing Home Yes Yes    Take 5 mg by mouth Every 12 (Twelve) Hours.    carvedilol (COREG) 6.25 MG tablet 3/4/2020 Nursing Home Yes Yes    Take 6.25 mg by mouth Every 12 (Twelve) Hours.    esomeprazole (nexIUM) 20 MG capsule 3/4/2020 Nursing Home Yes Yes    Take 20 mg by mouth Every Morning Before Breakfast.    folic acid (FOLVITE) 1 MG tablet 3/4/2020 Nursing Home Yes Yes    Take 1 mg by mouth Daily.    hyoscyamine sulfate (ANASPAZ) 0.125 MG tablet dispersible disintegrating tablet 3/4/2020 Nursing Home Yes Yes    Take 0.125 mg by mouth Every Night.    levETIRAcetam (KEPPRA) 100 MG/ML solution 3/4/2020 Nursing Home Yes Yes    Take 250 mg by mouth 2 (Two) Times a Day.    lisinopril (PRINIVIL,ZESTRIL) 20 MG tablet 3/4/2020 Nursing Home Yes Yes    Take 20 mg by mouth Daily.    Multiple Vitamins-Minerals (CERTAGEN PO) 3/4/2020 Nursing Home Yes Yes    Take 1 tablet by mouth Daily.    PARoxetine (PAXIL) 10 MG tablet 3/4/2020 Nursing Home Yes Yes    Take 10 mg by mouth Every Morning.    primidone (MYSOLINE) 250 MG tablet 3/4/2020 Nursing Home Yes Yes    Take 250 mg by mouth 3 (Three) Times a Day.    traZODone (DESYREL) 50 MG tablet 3/4/2020 Nursing Home Yes Yes    Take 50 mg by mouth Every Night.    acetaminophen (TYLENOL) 500 MG tablet Unknown Nursing Home Yes No    Take 500 mg by mouth Every 4 (Four) Hours As Needed for Mild Pain  .    docusate sodium (COLACE) 150 MG/15ML liquid Unknown Nursing Home Yes No    Take 100 mg by mouth 2 (Two) Times a Day As Needed (constipation).        [facility] Meds:    [START ON 3/6/2020] cefTRIAXone 1 g Intravenous Q24H   lactobacillus acidophilus 1 capsule Oral Daily   magnesium sulfate 4 g Intravenous Once   sodium chloride 10 mL Intravenous Q12H       sodium chloride 100 mL/hr Last Rate: 100 mL/hr (03/05/20 0902)     ---------------------------------------------------------------------------------------------------------------------   Vital Signs:  Temp:  [99.6 °F (37.6 °C)-100.6 °F (38.1 °C)] 100.6 °F (38.1 °C)  Heart Rate:  [] 132  Resp:  [20] 20  BP: ()/(63-80) 102/74      03/05/20  0047 03/05/20  0655   Weight: 68.8 kg (151 lb 10.9 oz) 65.5 kg (144 lb 7 oz)     Body mass index is 24.79 kg/m².  ---------------------------------------------------------------------------------------------------------------------   Physical exam:  Constitutional:  Well-developed and well-nourished.  No respiratory distress.        Musculoskeletal: Upon examination of the left lower extremity there is bruising scattered throughout the lateral aspect of the tibia.  There is also some bruising noted to the lateral aspect of the left femur.  Neurovascularly the patient is intact to the left lower extremity, positive pedal pulses noted to the left lower extremity.  Patient appears to be tender to the distal aspect of the left femur.  Patient has no cellulitis noted.  No open wounds noted.  No cyanosis to the left lower extremity noted.  No compartment syndrome noted.    ---------------------------------------------------------------------------------------------------------------------   .  ---------------------------------------------------------------------------------------------------------------------   Results from last 7 days   Lab Units 03/05/20  0926 03/05/20  0429   CRP mg/dL  --  7.46*   LACTATE mmol/L  --   1.2   WBC 10*3/mm3  --  4.32   HEMOGLOBIN g/dL 10.4* 9.7*   HEMATOCRIT % 33.0* 30.9*   MCV fL  --  84.4   MCHC g/dL  --  31.4*   PLATELETS 10*3/mm3  --  153   INR  1.12*  --          Results from last 7 days   Lab Units 03/05/20  0429   SODIUM mmol/L 137   POTASSIUM mmol/L 4.1   MAGNESIUM mg/dL 1.6   CHLORIDE mmol/L 100   CO2 mmol/L 25.0   BUN mg/dL 13   CREATININE mg/dL 0.43*   EGFR IF NONAFRICN AM mL/min/1.73 149   CALCIUM mg/dL 8.7   GLUCOSE mg/dL 115*   ALBUMIN g/dL 2.80*   BILIRUBIN mg/dL 0.6   ALK PHOS U/L 248*   AST (SGOT) U/L 180*   ALT (SGPT) U/L 47*   Estimated Creatinine Clearance: 140.3 mL/min (A) (by C-G formula based on SCr of 0.43 mg/dL (L)).  Ammonia   Date Value Ref Range Status   03/05/2020 75 (H) 11 - 51 umol/L Final     Results from last 7 days   Lab Units 03/05/20  0429   CK TOTAL U/L 166   TROPONIN T ng/mL <0.010         No results found for: HGBA1C  Lab Results   Component Value Date    TSH 2.150 03/05/2020    FREET4 1.56 03/05/2020     No results found for: PREGTESTUR, PREGSERUM, HCG, HCGQUANT  Pain Management Panel     There is no flowsheet data to display.        No results found for: BLOODCX  No results found for: URINECX  No results found for: WOUNDCX  No results found for: STOOLCX      ---------------------------------------------------------------------------------------------------------------------   Imaging Results (Last 7 Days)     Procedure Component Value Units Date/Time    CT Head Without Contrast [[id number]] Collected:  03/05/20 1050     Updated:  03/05/20 1053    Narrative:          EXAMINATION: CT HEAD WO CONTRAST-      CLINICAL INDICATION:     AMS; S72.22XA-Displaced subtrochanteric  fracture of left femur, initial encounter for closed fracture;  N30.00-Acute cystitis without hematuria     TECHNIQUE: Contiguous axial CT images of the head were obtained without  contrast administration.      Radiation dose reduction techniques were utilized per ALARA protocol.  Automated  exposure control was initiated through either or [employer] or  [employer] software packages by  protocol.       1094.37 mGy.cm     COMPARISON:  None.       FINDINGS: Generalized cerebral atrophy is present. There is no mass  effect, midline displacement, or hydrocephalus. Right periventricular  white matter change greater than left suggestive of chronic small vessel  ischemic change. There is no CT evidence of acute infarct or hemorrhage.  Bone windows reveal no osseous abnormalities or fractures.        Impression:       Right periventricular white matter change greater than left  suggestive of chronic small vessel ischemic change.     This report was finalized on 3/5/2020 10:51 AM by Dr. Mario Brasher MD.       CT Lower Extremity Bilateral Without Contrast [[id number]] Collected:  03/05/20 0245     Updated:  03/05/20 0247    Narrative:       CT LE BILAT WO    INDICATION:    Pain and left leg after injury tonight at nursing home    TECHNIQUE:   CT of the lower extremities without IV contrast. Coronal and sagittal reconstructions were obtained.  Radiation dose reduction techniques included automated exposure control or exposure modulation based on body size. Count of known CT and cardiac nuc med  studies performed in previous 12 months: 0.      COMPARISON:    None available.    FINDINGS:  There is an oblique markedly displaced fractures of the distal third of the left femoral shaft. Mild hemorrhage in the surrounding muscle. The right femur is normal.      Impression:       Acute displaced oblique distal left femur fracture    Signer Name: Arturo Reyes MD   Signed: 3/5/2020 2:45 AM   Workstation Name: [id number]    [facility]    CT Pelvis Without Contrast [[id number]] Collected:  03/05/20 0244     Updated:  03/05/20 0246    Narrative:       CT Pelvis WO    INDICATION:   Patient suffered injury while having close change at nursing home tonight. Pain in left leg    TECHNIQUE:   CT of  the pelvis without IV contrast. Coronal and sagittal reconstructions were obtained.  Radiation dose reduction techniques included automated exposure control or exposure modulation based on body size. Count of known CT and cardiac nuc med studies  performed in previous 12 months: 0.     COMPARISON:   None available.    FINDINGS:  Patient could only lie on the left side this examination the patient's hips are flexed. No fracture or dislocation is visible. Visualized bowel is normal. Bladder is normal.      Impression:       Negative CT of the pelvis. No fracture or hip dislocation is visible.          Signer Name: Arturo Reyes MD   Signed: 3/5/2020 2:44 AM   Workstation Name: [id number]    [facility]        ----------------------------------------------------------------------------------------------------------------------  Assessment:   1. Acute displaced oblique distal left femur fracture          Plan:   Case was discussed with Dr. Nicholas.  Per his recommendations we will obtain plain films of the left femur.  Patient is chronically anticoagulated with Eliquis, last dose last night.  Patient will be followed close by orthopedic services.  Dr. Nicholas will determine further plan of care.  Nursing is aware of treatment plan as well as hospitalist service.     Thank you for the consult.    Kris Mancuso, MARY  03/05/20  1:39 PM

## 2020-03-05 NOTE — H&P
"  [facility] Medicine Services  History & Physical          Patient Identification:  Name:  Bere Caal  Age:  64 y.o.  Sex:  female  :  1955  MRN:  [id number]   Visit Number:  [id number]  Primary Care Physician:  Kris Garcia MD    I have seen the patient in conjunction with MARY Reyna and I agree with the following statements:     Subjective     Chief complaint: sent from nursing home for \"snap\" heard at leg    History of presenting illness:    Mrs. Caal is a 64 year old female patient who presented to [facility] ED from a local nursing home. The ED reported that she was sent after being transferred in bed and the staff thought they heard a snap and had concern that her leg was injured. She is non-verbal, she is awake and alert, she has garbled-like speech she does squeeze with her right hand, her left hand is contracted.  Due to her mental status she is unable to give a review of systems or HPI or medical history.  Did call [facility] and spoke with Gianna Logan,she states this is more normal baseline with garbled speech, she is a one-to-one feeding she is on modified diet with puréed and nectar thick liquids, she is bedfast.  She did have a PEG tube which was pulled on 2019 due to continuous leaking.    Her work up in the ED showed a negative troponin T, glucose 115, sodium 137, potassium 4.1, bicarb 25.0, anion gap 12, BUN 13, creatinine 0.43, calcium 8.7, alkaline phosphatase 248, ALT 47, , total bilirubin 0.6, albumin 2.0, phosphorus 2.8, TSH 2.150, CRP 7.46, lactate 1.2, magnesium 1.6, WBC 4.32, H&H 9.7 and 30.9, platelet count 153.  UA did show positive nitrites, moderate leukocytes, 6-12 RBC, 31-50 WBC, 4+ bacteria, 7-12 squamous epithelial cells.  CT of the abdomen pelvis without contrast showed negative CT of the pelvis no fracture or hip dislocation per radiology reading.  CT of the lower extremity bilaterally without contrast " showed acute displaced oblique distal left femur fracture, mild hemorrhage in the surrounding muscle, right femur is normal per radiology reading.    Her past medical history is significant for hemiplegia and hemiparesis, aphasia, dysphasia, seizures,  hypertension, traumatic intracerebral hemorrhage, chronic atrial fibrillation she [facility], Randi Chavis is her . Past surgical history is unknown at this time.         ---------------------------------------------------------------------------------------------------------------------   Review of Systems   Reason unable to perform ROS: unable to obtain due to mental status.       ---------------------------------------------------------------------------------------------------------------------   Past Medical History:   Diagnosis Date   • Aphasia    • Atrial fibrillation (CMS/HCC)    • Dysphagia    • Hemiplegia (CMS/HCC)    • Hypertension    • Seizures (CMS/HCC)    • Traumatic intracerebral hemorrhage (CMS/HCC)      History reviewed. No pertinent surgical history.  History reviewed. No pertinent family history.  Social History     Socioeconomic History   • Marital status: Single     Spouse name: Not on file   • Number of children: Not on file   • Years of education: Not on file   • Highest education level: Not on file   Tobacco Use   • Smoking status: Unknown If Ever Smoked   • Smokeless tobacco: Never Used   Substance and Sexual Activity   • Alcohol use: Not Currently     Comment: unknown, current nursing home resident    • Drug use: Not Currently     Comment: unknown    • Sexual activity: Not Currently     ---------------------------------------------------------------------------------------------------------------------   Allergies:  Patient has no known allergies.  ---------------------------------------------------------------------------------------------------------------------     Medications below are reported home medications  pulling from within the system; at this time, these medications have not been reconciled unless otherwise specified and are in the verification process for further verifcation as current home medications.    Prior to Admission Medications     None        Hospital Scheduled Meds:    [START ON 3/6/2020] cefTRIAXone 1 g Intravenous Q24H   folic acid 1 mg Oral Daily   lactobacillus acidophilus 1 capsule Oral Daily   levETIRAcetam 250 mg Intravenous Q12H   metoprolol tartrate 25 mg Oral Q12H   [START ON 3/6/2020] PARoxetine 10 mg Oral QAM   primidone 250 mg Oral TID   sodium chloride 10 mL Intravenous Q12H   traZODone 50 mg Oral Nightly       dextrose 5 % and sodium chloride 0.9 % 100 mL/hr Last Rate: 100 mL/hr (03/05/20 1740)     ---------------------------------------------------------------------------------------------------------------------   Objective       Vital Signs:  Temp:  [99.4 °F (37.4 °C)-100.6 °F (38.1 °C)] 99.4 °F (37.4 °C)  Heart Rate:  [] 98  Resp:  [20] 20  BP: ()/(58-80) 112/66      03/05/20  0047 03/05/20  0655   Weight: 68.8 kg (151 lb 10.9 oz) 65.5 kg (144 lb 7 oz)     Body mass index is 24.79 kg/m².  ---------------------------------------------------------------------------------------------------------------------       Physical Exam  Constitutional: awake and alert female in no distress .     HENT:  Head: Normocephalic and atraumatic.  Mouth:  Moist mucous membranes.    Eyes:  Pupils are equal, round, and reactive to light.  No scleral icterus.  Neck:  Neck supple.  No JVD present.    Cardiovascular: tachycardic, irregularly irregular rhythm.  with no murmur.  Pulmonary/Chest:  No respiratory distress, no wheezes, no crackles, with normal breath sounds and good air movement.  Abdominal:  Soft.  Bowel sounds are present.  No distension and no tenderness. Previous G-Tube site present, appears excoriated  Musculoskeletal:  Bilateral upper contractures, left worse than right, left hand  also contracted, right hand opens and she is able to slightly squeeze my hand, knees also appear contracted bilaterally, peripheral pulses intact.  Neurological:  Alert, attempts to garble speech when spoken too, followed my command to squeeze my hand, the nursing home reports her speech is always like this. Nonverbal  Skin:  Skin is warm and dry.  No rash noted.  No pallor.   Psychiatric: Behavior is normal.    ---------------------------------------------------------------------------------------------------------------------  EKG:          ---------------------------------------------------------------------------------------------------------------------   Results from last 7 days   Lab Units 03/05/20 0926 03/05/20 0429   CRP mg/dL  --  7.46*   LACTATE mmol/L  --  1.2   WBC 10*3/mm3  --  4.32   HEMOGLOBIN g/dL 10.4* 9.7*   HEMATOCRIT % 33.0* 30.9*   MCV fL  --  84.4   MCHC g/dL  --  31.4*   PLATELETS 10*3/mm3  --  153   INR  1.12*  --          Results from last 7 days   Lab Units 03/05/20  0429   SODIUM mmol/L 137   POTASSIUM mmol/L 4.1   MAGNESIUM mg/dL 1.6   CHLORIDE mmol/L 100   CO2 mmol/L 25.0   BUN mg/dL 13   CREATININE mg/dL 0.43*   EGFR IF NONAFRICN AM mL/min/1.73 149   CALCIUM mg/dL 8.7   GLUCOSE mg/dL 115*   ALBUMIN g/dL 2.80*   BILIRUBIN mg/dL 0.6   ALK PHOS U/L 248*   AST (SGOT) U/L 180*   ALT (SGPT) U/L 47*   Estimated Creatinine Clearance: 140.3 mL/min (A) (by C-G formula based on SCr of 0.43 mg/dL (L)).  Ammonia   Date Value Ref Range Status   03/05/2020 75 (H) 11 - 51 umol/L Final     Results from last 7 days   Lab Units 03/05/20  0429   CK TOTAL U/L 166   TROPONIN T ng/mL <0.010         No results found for: HGBA1C  Lab Results   Component Value Date    TSH 2.150 03/05/2020    FREET4 1.56 03/05/2020     No results found for: PREGTESTUR, PREGSERUM, HCG, HCGQUANT  Pain Management Panel     There is no flowsheet data to display.                 ---------------------------------------------------------------------------------------------------------------------  Imaging Results (Last 7 Days)     Procedure Component Value Units Date/Time    CT Head Without Contrast [[id number]] Collected:  03/05/20 1050     Updated:  03/05/20 1053    Narrative:          EXAMINATION: CT HEAD WO CONTRAST-      CLINICAL INDICATION:     AMS; S72.22XA-Displaced subtrochanteric  fracture of left femur, initial encounter for closed fracture;  N30.00-Acute cystitis without hematuria     TECHNIQUE: Contiguous axial CT images of the head were obtained without  contrast administration.      Radiation dose reduction techniques were utilized per ALARA protocol.  Automated exposure control was initiated through either or [employer] or  [employer] software packages by  protocol.       1094.37 mGy.cm     COMPARISON:  None.       FINDINGS: Generalized cerebral atrophy is present. There is no mass  effect, midline displacement, or hydrocephalus. Right periventricular  white matter change greater than left suggestive of chronic small vessel  ischemic change. There is no CT evidence of acute infarct or hemorrhage.  Bone windows reveal no osseous abnormalities or fractures.        Impression:       Right periventricular white matter change greater than left  suggestive of chronic small vessel ischemic change.     This report was finalized on 3/5/2020 10:51 AM by Dr. Mario Brasher MD.       CT Lower Extremity Bilateral Without Contrast [[id number]] Collected:  03/05/20 0245     Updated:  03/05/20 0247    Narrative:       CT LE BILAT WO    INDICATION:    Pain and left leg after injury tonight at nursing home    TECHNIQUE:   CT of the lower extremities without IV contrast. Coronal and sagittal reconstructions were obtained.  Radiation dose reduction techniques included automated exposure control or exposure modulation based on body size. Count of known CT and cardiac nuc med  studies  performed in previous 12 months: 0.      COMPARISON:    None available.    FINDINGS:  There is an oblique markedly displaced fractures of the distal third of the left femoral shaft. Mild hemorrhage in the surrounding muscle. The right femur is normal.      Impression:       Acute displaced oblique distal left femur fracture    Signer Name: Arturo Reyes MD   Signed: 3/5/2020 2:45 AM   Workstation Name: [facility]    Radiology [facility]    CT Pelvis Without Contrast [[id number]] Collected:  03/05/20 0244     Updated:  03/05/20 0246    Narrative:       CT Pelvis WO    INDICATION:   Patient suffered injury while having close change at nursing home tonight. Pain in left leg    TECHNIQUE:   CT of the pelvis without IV contrast. Coronal and sagittal reconstructions were obtained.  Radiation dose reduction techniques included automated exposure control or exposure modulation based on body size. Count of known CT and cardiac nuc med studies  performed in previous 12 months: 0.     COMPARISON:   None available.    FINDINGS:  Patient could only lie on the left side this examination the patient's hips are flexed. No fracture or dislocation is visible. Visualized bowel is normal. Bladder is normal.      Impression:       Negative CT of the pelvis. No fracture or hip dislocation is visible.          Signer Name: Arturo Reyes MD   Signed: 3/5/2020 2:44 AM   Workstation Name: [facility]          Cultures: blood and urine cultures collected in the ED    ---------------------------------------------------------------------------------------------------------------------  Assessment / Plan       Assessment and Plan:    Displaced oblique distal left femur fracture, with mild hemorrhage in surrounding muscle: NPO, orthopedic surgery consulted. Neurovascular checks every 4 hours. PRN Pain medication. INR ordered. Hold eliquis.     Sepsis with UTI (, temp 100.1, CRP 7.46): rocephin  2gm IV was started in the ED. Continue with rocephin daily. Urine and blood culture collected. Repeat labs in the am. Start on lactobacillus. Will get xray chest and add flagyl since risk of aspiration is high.     Hyperammonemia. Will do a dose of lactulose rectal enema once. Liver enzymes elevated. Will get US liver.     Normocytic anemia: H/H 9.7/30.9, baseline unknown no previous labs available, repeat H/H stable. Monitor.      Elevated Transaminases and alkaline phosphatase: ALT 47, , Alk phosph 248, no previous labs available. Acute Hepatitis panel ordered, total bilirubin is 0.6. No Statins on med list from nursing home, there is Tylenol PRN, level ordered.  Hold tylenol.     Atrial Fibrillation, chronicity unknown, with RVR: the ED was unable to get EKG, I have ordered one at this time. She is on elqiuis for stroke prevention, last dose per the nursing home staff was 3/4/2020 at 2000. Start on lopressor for rate control when available by pharmacy. Spoke with Dr. Dela Cruz, Dig ordered, monitor response. Hold coreg and eliquis.     Hemiplegia and hemiparesis, hx of nontraumatic intracerebral hemorrhage, non verbal and bed ridden: turn q2hr, wound care for any skin breakdown. Supportive care. CT head with chronic changes.     Essential hypertension: Hold home coreg since BP borderline. On lopressor.      Dysphasia: utilizes modified diet at the nursing home, puree with nectar thick liquids, have SLP evaluate after surgery. On D5W and continue with accuchecks.    Hx of Seizures: continue keppra iv, and primidone seizure precautions. Nursing home staff I spoke with states they are unaware of any recent seizures.     Activity: bedrest, turn q2hr   Tubes/Lines/Drains: hutton cath  Diet: NPO, gentle IV fluids for hydration, accu checks   DVT prophylaxis: SCD to right leg  GI prophylaxis: continue PPI from home     Code status: Full Code    Patient is high risk for the following reasons: UTI, Femur Fracture, IV  pain medication    Gabriella Dela Cruz MD  03/05/20  7:38 PM  ---------------------------------------------------------------------------------------------------------------------   Patient seen and examined independently.  I agree with assessment plan and history and physical by MARY Grossman.  The note has been edited to reflect my findings.    Garbiella Perez MD   [facility]

## 2020-03-05 NOTE — PLAN OF CARE
Patient new admit this am.  Heart rate elevated; gave meds and heart rate down WNL.  Possible surgery tomorrow.  Will continue to monitor.

## 2020-03-05 NOTE — PLAN OF CARE
Pt unable to transfer to radiology suite at this time. Overt s/s of aspiration at bedside this pm. Recommend continue NPO status until pt is able to functionally participate in instrumental dysphagia evaluation given pt's prior history of dysphagia and previous PEG Placement. D/w SLP from snf who reports emesis/gagging during meals more frequently w/ pt scheduled to come for outpatient MBS next week. Will attempt MBS tomorrow pending pt participation.  Problem: Patient Care Overview  Goal: Plan of Care Review  Outcome: Ongoing (interventions implemented as appropriate)

## 2020-03-05 NOTE — THERAPY EVALUATION
Acute Care - Speech Language Pathology   Swallow Initial Evaluation [facility]   CLINICAL DYSPHAGIA ASSESSMENT     Patient Name: Bere Caal  : 1955  MRN: [id number]  Today's Date: 3/5/2020      Admit Date: 3/5/2020  Bere Caal  is seen at bedside this pm to assess safety/efficacy of swallowing fnx, determine safest/least restrictive diet tolerance. Pt has significant h/o aphasia, a-fib, dysphagia, hemiplegia, hypertension, seizures and traumatic intracerebral hemorrhage. Pt is currently on modified po diet of puree w/ nectar thick liquids at [facility]. D/w SLP via telephone who reports concerns for increased aspiration risk w/ frequent gagging and emesis during meals. Pt was scheduled to come to [facility] for outpatient modified barium swallow study next week. Pt is felt to most benefit from instrumental MBS to objectively assess swallowing fnx given pt's h/o dysphagia. D/w RN Katy via telephone when transport arrived pt became agitated and refused to be transferred to radiology suite to participate. Per this status MBS deferred, RN requests clinical dysphagia assessment.    Social History     Socioeconomic History   • Marital status: Single     Spouse name: Not on file   • Number of children: Not on file   • Years of education: Not on file   • Highest education level: Not on file   Tobacco Use   • Smoking status: Unknown If Ever Smoked   Substance and Sexual Activity   • Alcohol use: Not Currently     Comment: unknown, current nursing home resident    • Drug use: Not Currently     Comment: unknown    • Sexual activity: Not Currently        NO recent chest xray available for review.    Diet Orders (active) (From admission, onward)     Start     Ordered    20 0718  NPO Diet  Diet Effective Now      20 0717                She is observed on ra w/o complications, tolerating well.    Pt is positioned upright and centered in bed at 45 degree positioning to accept multiple po presentations of ice  chips and thin liquids via spoon bowl and cup.  Pt is unable to self feed.     Facial/oral structures are symmetrical upon observation. Lingual protrusion reveals no deviation. Oral mucosa are mildly xerostomic, pink, and clean. Secretions are clear, thin, and well controlled. OROM/STEVEN is delayed/weak to imitate limited oral postures 2/2 pt participation. Noted significant odor coming from oral cavity. RN Katy is aware reports oral care was performed multiple times this shift. Gag is not assessed. Volitional cough is CNA as pt does not elicit volitional cough. Voice is weak in intensity, clear in quality w/ limited verbalizations. Pt is aphasic and this is consistent w/ her baseline.    Upon po presentations, adequate bolus anticipation and acceptance w/ good labial seal for bolus clearance via spoon bowl, cup rim stability and suction via straw. Bolus formation, manipulation and control are generally weak w/ increased oral prep time w/ ice chips and primarily phasic mastication pattern. A-p transit is delayed w/o oral residue.     Pharyngeal swallow is delayed w/ weak hyolaryngeal elevation per palpation. Overt s/s of aspiration w/ thin liquids and ice chips in 4/4 trials w/ cough response elicited. Wet respirations evidenced post po presentations, unable to assess vocal quality as pt does not verbalize on command. Further po presentations deferred 2/2 observed dysphagia.        Visit Dx:     ICD-10-CM ICD-9-CM   1. Closed displaced subtrochanteric fracture of left femur, initial encounter (CMS/[facility]) S72.22XA 820.22   2. 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 No pertinent surgical history.      EDUCATION  The patient has been educated in the following areas:   Dysphagia (Swallowing Impairment) Oral Care/Hydration NPO rationale.    SLP Recommendation and Plan    Impression: Per this evaluation, Ms. Caal presents w/ moderate-severe oral dysphagia, a suspected pharyngeal dysphagia. Overt s/s of aspiration w/ thin liquids and ice chips in 4/4 trials w/ cough response elicited. Wet respirations evidenced post po presentations. Further po presentations deferred 2/2 observed dysphagia.    Per this assessment and pt's h/o oropharyngeal dysphagia, SLP cannot recommend safe po diet at this time until instrumental evaluation is completed. SLP to f/u tomorrow am for pt status to functionally participate.      Recommendations:  1. NPO status  2. Meds via non-oral method   3. Universal aspiration precautions.  4. BROOK precautions.  5. Strict oral care protocol w/ suction Q4 2/2 NPO status.    SLP to f/u daily for pt candidacy to functionally participate in instrumental dysphagia evaluation.     D/w pt results and recommendations w/ verbal agreement.    D/w RN Katy results and recommendations w/ verbal agreement.    D/w Ngoc HERNANDEZ via Epic Secure chat results and recommendations.    Thank you for allowing me to participate in the care of your patient-  Nicki Leiva M.S., NAVID-SLPTime Calculation:   Time Calculation- SLP     Time Calculation- SLP     Row Name 03/05/20 1547 03/05/20 1209    SLP - Next Appointment  03/06/20  -Recorded by: JOSÉ MIGUEL  03/06/20  -Recorded by: JOSÉ MIGUEL            User Key     Initials Name Provider Type    Nicki Jay MS CCC-SLP Speech and Language Pathologist          Therapy Charges for Today     Code Description Service Date Service Provider Modifiers Qty    [id number]  ST EVAL ORAL PHARYNG SWALLOW 4 3/5/2020 Nicki Leiva MS CCC-SLP GN 1               Nicki Leiva MS  CCC-SLP  3/5/2020

## 2020-03-05 NOTE — ED PROVIDER NOTES
Subjective   64-year-old female in the emergency department from the nursing home for suspected left lower extremity injury.  According to the nursing home staff they were transitioning and rolling the patient in her bed, and heard a possible snap to her left lower extremity.  Concerned that they may have injured her left lower extremity, she was sent to the emergency department for further evaluation and treatment.      History provided by:  Nursing [place]   used: No    Leg Pain   Location:  Leg  Injury: yes    Mechanism of injury comment:  Injury occurred at the nursing home while rolling the patient.  Leg location:  L lower leg  Pain details:     Quality:  Sharp    Radiates to:  Does not radiate    Severity:  Severe    Onset quality:  Sudden    Timing:  Constant    Progression:  Worsening  Chronicity:  New  Dislocation: no    Foreign body present:  No foreign bodies  Tetanus status:  Up to date  Prior injury to area:  No  Relieved by:  Nothing  Worsened by:  Nothing  Ineffective treatments:  None tried  Associated symptoms: no back pain, no decreased ROM, no fatigue, no fever, no itching, no muscle weakness, no neck pain, no numbness, no stiffness, no swelling and no tingling    Risk factors: obesity    Risk factors: no concern for non-accidental trauma, no frequent fractures, no known bone disorder and no recent illness        Review of Systems   Unable to perform ROS: Patient nonverbal   Constitutional: Negative for fatigue and fever.   Musculoskeletal: Negative for back pain, neck pain and stiffness.   Skin: Negative for itching.   All other systems reviewed and are negative.      History reviewed. No pertinent past medical history.    No Known Allergies    History reviewed. No pertinent surgical history.    No family history on file.    Social History     Socioeconomic History   • Marital status: Single     Spouse name: Not on file   • Number of children: Not on file   • Years of education:  Not on file   • Highest education level: Not on file           Objective   Physical Exam   Constitutional: She appears well-developed and well-nourished.  Non-toxic appearance. No distress.   HENT:   Head: Normocephalic and atraumatic.   Right Ear: External ear normal.   Left Ear: External ear normal.   Nose: Nose normal.   Mouth/Throat: Oropharynx is clear and moist and mucous membranes are normal. No oropharyngeal exudate. No tonsillar exudate.   Eyes: Pupils are equal, round, and reactive to light. Conjunctivae, EOM and lids are normal.   Neck: Normal range of motion and full passive range of motion without pain. Neck supple. No thyromegaly present.   Cardiovascular: Normal rate, regular rhythm, S1 normal, S2 normal, normal heart sounds, intact distal pulses and normal pulses.   Pulmonary/Chest: Effort normal and breath sounds normal. No tachypnea. No respiratory distress. She has no decreased breath sounds. She has no wheezes. She has no rales. She exhibits no tenderness.   Abdominal: Soft. Normal appearance and bowel sounds are normal. She exhibits no distension. There is no tenderness. There is no rebound and no guarding.   Musculoskeletal: Normal range of motion. She exhibits tenderness. She exhibits no edema or deformity.        Legs:  Lymphadenopathy:     She has no cervical adenopathy.   Neurological: She is alert. She has normal strength. No cranial nerve deficit or sensory deficit. GCS eye subscore is 4. GCS verbal subscore is 5. GCS motor subscore is 6.   Skin: Skin is warm, dry and intact. No rash noted. She is not diaphoretic. No erythema. No pallor.   Psychiatric: She has a normal mood and affect. Her speech is normal and behavior is normal. Judgment and thought content normal. Cognition and memory are normal.   Nursing note and vitals reviewed.      Procedures           ED Course  ED Course as of Mar 05 0607   Thu Mar 05, 2020   9310 IMPRESSION:  Acute displaced oblique distal left femur fracture      CT Lower Extremity Bilateral Without Contrast [ES]   0545 IMPRESSION:  Negative CT of the pelvis. No fracture or hip dislocation is visible.   CT Pelvis Without Contrast [ES]   0548 Discussed case with on-call hospitalist Dr. Gibson, and will admit for further evaluation and treatment.    [ES]      ED Course User Index  [ES] Jc Peña MD                                           MDM  Number of Diagnoses or Management Options     Amount and/or Complexity of Data Reviewed  Clinical lab tests: reviewed and ordered  Tests in the radiology section of CPT®: reviewed and ordered  Tests in the medicine section of CPT®: ordered and reviewed  Review and summarize past medical records: yes  Discuss the patient with other providers: yes  Independent visualization of images, tracings, or specimens: yes    Risk of Complications, Morbidity, and/or Mortality  Presenting problems: moderate  Diagnostic procedures: moderate  Management options: moderate    Patient Progress  Patient progress: stable      Final diagnoses:   Closed displaced subtrochanteric fracture of left femur, initial encounter (CMS/[facility])   Acute cystitis without hematuria            Jc Peña MD  03/05/20 0631

## 2020-03-05 NOTE — ED NOTES
Spoke to on call state guardian, permission to admit pt. Explained pt had femur fx, that it might require surgery. Ortho to see pt in AM. If surgery needed we would need to call state guardian back.     Shani Bella RN  03/05/20 0558

## 2020-03-06 ENCOUNTER — APPOINTMENT (OUTPATIENT)
Dept: CARDIOLOGY | Facility: HOSPITAL | Age: 65
End: 2020-03-06

## 2020-03-06 ENCOUNTER — APPOINTMENT (OUTPATIENT)
Dept: ULTRASOUND IMAGING | Facility: HOSPITAL | Age: 65
End: 2020-03-06

## 2020-03-06 ENCOUNTER — APPOINTMENT (OUTPATIENT)
Dept: GENERAL RADIOLOGY | Facility: HOSPITAL | Age: 65
End: 2020-03-06

## 2020-03-06 LAB
25(OH)D3 SERPL-MCNC: 33.2 NG/ML (ref 30–100)
ALBUMIN SERPL-MCNC: 2.53 G/DL (ref 3.5–5.2)
ALBUMIN/GLOB SERPL: 0.6 G/DL
ALP SERPL-CCNC: 194 U/L (ref 39–117)
ALT SERPL W P-5'-P-CCNC: 33 U/L (ref 1–33)
AMMONIA BLD-SCNC: 105 UMOL/L (ref 11–51)
ANION GAP SERPL CALCULATED.3IONS-SCNC: 8.6 MMOL/L (ref 5–15)
ANISOCYTOSIS BLD QL: NORMAL
AST SERPL-CCNC: 101 U/L (ref 1–32)
BASOPHILS # BLD AUTO: 0.01 10*3/MM3 (ref 0–0.2)
BASOPHILS NFR BLD AUTO: 0.2 % (ref 0–1.5)
BILIRUB SERPL-MCNC: 0.9 MG/DL (ref 0.2–1.2)
BUN BLD-MCNC: 8 MG/DL (ref 8–23)
BUN/CREAT SERPL: 20.5 (ref 7–25)
CALCIUM SPEC-SCNC: 8 MG/DL (ref 8.6–10.5)
CHLORIDE SERPL-SCNC: 103 MMOL/L (ref 98–107)
CO2 SERPL-SCNC: 23.4 MMOL/L (ref 22–29)
CREAT BLD-MCNC: 0.39 MG/DL (ref 0.57–1)
CRP SERPL-MCNC: 12.77 MG/DL (ref 0–0.5)
DEPRECATED RDW RBC AUTO: 59.8 FL (ref 37–54)
EOSINOPHIL # BLD AUTO: 0.01 10*3/MM3 (ref 0–0.4)
EOSINOPHIL NFR BLD AUTO: 0.2 % (ref 0.3–6.2)
ERYTHROCYTE [DISTWIDTH] IN BLOOD BY AUTOMATED COUNT: 18.2 % (ref 12.3–15.4)
FERRITIN SERPL-MCNC: 167.6 NG/ML (ref 13–150)
FOLATE SERPL-MCNC: 17.8 NG/ML (ref 4.78–24.2)
GFR SERPL CREATININE-BSD FRML MDRD: >150 ML/MIN/1.73
GLOBULIN UR ELPH-MCNC: 4.2 GM/DL
GLUCOSE BLD-MCNC: 131 MG/DL (ref 65–99)
GLUCOSE BLDC GLUCOMTR-MCNC: 101 MG/DL (ref 70–130)
GLUCOSE BLDC GLUCOMTR-MCNC: 103 MG/DL (ref 70–130)
GLUCOSE BLDC GLUCOMTR-MCNC: 106 MG/DL (ref 70–130)
GLUCOSE BLDC GLUCOMTR-MCNC: 120 MG/DL (ref 70–130)
GLUCOSE BLDC GLUCOMTR-MCNC: 140 MG/DL (ref 70–130)
GLUCOSE BLDC GLUCOMTR-MCNC: 96 MG/DL (ref 70–130)
HBV SURFACE AB SER RIA-ACNC: NORMAL
HBV SURFACE AG SERPL QL IA: NORMAL
HBV SURFACE AG SERPL QL NT: POSITIVE
HCT VFR BLD AUTO: 29.5 % (ref 34–46.6)
HGB BLD-MCNC: 8.9 G/DL (ref 12–15.9)
HYPOCHROMIA BLD QL: NORMAL
IMM GRANULOCYTES # BLD AUTO: 0.02 10*3/MM3 (ref 0–0.05)
IMM GRANULOCYTES NFR BLD AUTO: 0.4 % (ref 0–0.5)
IRON 24H UR-MRATE: 17 MCG/DL (ref 37–145)
IRON SATN MFR SERPL: 7 % (ref 20–50)
LYMPHOCYTES # BLD AUTO: 0.88 10*3/MM3 (ref 0.7–3.1)
LYMPHOCYTES NFR BLD AUTO: 18.3 % (ref 19.6–45.3)
MAGNESIUM SERPL-MCNC: 2 MG/DL (ref 1.6–2.4)
MCH RBC QN AUTO: 27.1 PG (ref 26.6–33)
MCHC RBC AUTO-ENTMCNC: 30.2 G/DL (ref 31.5–35.7)
MCV RBC AUTO: 89.9 FL (ref 79–97)
MONOCYTES # BLD AUTO: 0.6 10*3/MM3 (ref 0.1–0.9)
MONOCYTES NFR BLD AUTO: 12.5 % (ref 5–12)
NEUTROPHILS # BLD AUTO: 3.28 10*3/MM3 (ref 1.7–7)
NEUTROPHILS NFR BLD AUTO: 68.4 % (ref 42.7–76)
NRBC BLD AUTO-RTO: 0 /100 WBC (ref 0–0.2)
NT-PROBNP SERPL-MCNC: 1002 PG/ML (ref 5–900)
PLAT MORPH BLD: NORMAL
PLATELET # BLD AUTO: 133 10*3/MM3 (ref 140–450)
PMV BLD AUTO: 10 FL (ref 6–12)
POTASSIUM BLD-SCNC: 3.8 MMOL/L (ref 3.5–5.2)
PROT SERPL-MCNC: 6.7 G/DL (ref 6–8.5)
RBC # BLD AUTO: 3.28 10*6/MM3 (ref 3.77–5.28)
SODIUM BLD-SCNC: 135 MMOL/L (ref 136–145)
TIBC SERPL-MCNC: 259 MCG/DL (ref 298–536)
TRANSFERRIN SERPL-MCNC: 174 MG/DL (ref 200–360)
VIT B12 BLD-MCNC: 1445 PG/ML (ref 211–946)
WBC NRBC COR # BLD: 4.8 10*3/MM3 (ref 3.4–10.8)

## 2020-03-06 PROCEDURE — 87350 HEPATITIS BE AG IA: CPT | Performed by: HOSPITALIST

## 2020-03-06 PROCEDURE — 83735 ASSAY OF MAGNESIUM: CPT | Performed by: HOSPITALIST

## 2020-03-06 PROCEDURE — 25010000002 CEFTRIAXONE: Performed by: HOSPITALIST

## 2020-03-06 PROCEDURE — 82140 ASSAY OF AMMONIA: CPT | Performed by: HOSPITALIST

## 2020-03-06 PROCEDURE — 82728 ASSAY OF FERRITIN: CPT | Performed by: HOSPITALIST

## 2020-03-06 PROCEDURE — 80053 COMPREHEN METABOLIC PANEL: CPT | Performed by: HOSPITALIST

## 2020-03-06 PROCEDURE — 73552 X-RAY EXAM OF FEMUR 2/>: CPT | Performed by: RADIOLOGY

## 2020-03-06 PROCEDURE — 94799 UNLISTED PULMONARY SVC/PX: CPT

## 2020-03-06 PROCEDURE — 93306 TTE W/DOPPLER COMPLETE: CPT

## 2020-03-06 PROCEDURE — 83540 ASSAY OF IRON: CPT | Performed by: HOSPITALIST

## 2020-03-06 PROCEDURE — 76705 ECHO EXAM OF ABDOMEN: CPT | Performed by: RADIOLOGY

## 2020-03-06 PROCEDURE — 85025 COMPLETE CBC W/AUTO DIFF WBC: CPT | Performed by: HOSPITALIST

## 2020-03-06 PROCEDURE — 71045 X-RAY EXAM CHEST 1 VIEW: CPT

## 2020-03-06 PROCEDURE — 83880 ASSAY OF NATRIURETIC PEPTIDE: CPT | Performed by: HOSPITALIST

## 2020-03-06 PROCEDURE — 84466 ASSAY OF TRANSFERRIN: CPT | Performed by: HOSPITALIST

## 2020-03-06 PROCEDURE — 25010000002 LEVETRIRACETAM PER 10 MG: Performed by: HOSPITALIST

## 2020-03-06 PROCEDURE — 99232 SBSQ HOSP IP/OBS MODERATE 35: CPT | Performed by: HOSPITALIST

## 2020-03-06 PROCEDURE — 85007 BL SMEAR W/DIFF WBC COUNT: CPT | Performed by: HOSPITALIST

## 2020-03-06 PROCEDURE — 76705 ECHO EXAM OF ABDOMEN: CPT

## 2020-03-06 PROCEDURE — 71045 X-RAY EXAM CHEST 1 VIEW: CPT | Performed by: RADIOLOGY

## 2020-03-06 PROCEDURE — 93306 TTE W/DOPPLER COMPLETE: CPT | Performed by: INTERNAL MEDICINE

## 2020-03-06 PROCEDURE — 82962 GLUCOSE BLOOD TEST: CPT

## 2020-03-06 PROCEDURE — 73552 X-RAY EXAM OF FEMUR 2/>: CPT

## 2020-03-06 PROCEDURE — 86140 C-REACTIVE PROTEIN: CPT | Performed by: HOSPITALIST

## 2020-03-06 RX ORDER — LACTULOSE 10 G/15ML
60 SOLUTION ORAL 3 TIMES DAILY
Status: DISCONTINUED | OUTPATIENT
Start: 2020-03-06 | End: 2020-03-07

## 2020-03-06 RX ORDER — FAMOTIDINE 10 MG/ML
20 INJECTION, SOLUTION INTRAVENOUS EVERY 12 HOURS SCHEDULED
Status: DISCONTINUED | OUTPATIENT
Start: 2020-03-06 | End: 2020-03-08

## 2020-03-06 RX ADMIN — METRONIDAZOLE 500 MG: 500 INJECTION, SOLUTION INTRAVENOUS at 05:10

## 2020-03-06 RX ADMIN — SODIUM CHLORIDE 250 MG: 9 INJECTION, SOLUTION INTRAVENOUS at 20:38

## 2020-03-06 RX ADMIN — FAMOTIDINE 20 MG: 10 INJECTION INTRAVENOUS at 18:53

## 2020-03-06 RX ADMIN — CEFTRIAXONE 2 G: 2 INJECTION, POWDER, FOR SOLUTION INTRAMUSCULAR; INTRAVENOUS at 17:51

## 2020-03-06 RX ADMIN — HYOSCYAMINE SULFATE 125 MCG: 0.12 TABLET, ORALLY DISINTEGRATING ORAL at 20:40

## 2020-03-06 RX ADMIN — METRONIDAZOLE 500 MG: 500 INJECTION, SOLUTION INTRAVENOUS at 21:01

## 2020-03-06 RX ADMIN — LACTULOSE 60 G: 10 SOLUTION ORAL at 17:09

## 2020-03-06 RX ADMIN — LACTULOSE 60 G: 10 SOLUTION ORAL at 20:38

## 2020-03-06 RX ADMIN — METRONIDAZOLE 500 MG: 500 INJECTION, SOLUTION INTRAVENOUS at 14:28

## 2020-03-06 RX ADMIN — DEXTROSE AND SODIUM CHLORIDE 100 ML/HR: 5; 900 INJECTION, SOLUTION INTRAVENOUS at 05:10

## 2020-03-06 RX ADMIN — LACTULOSE 300 ML: 20 SOLUTION ORAL at 04:24

## 2020-03-06 RX ADMIN — CEFTRIAXONE 1 G: 1 INJECTION, POWDER, FOR SOLUTION INTRAMUSCULAR; INTRAVENOUS at 06:46

## 2020-03-06 RX ADMIN — SODIUM CHLORIDE 250 MG: 9 INJECTION, SOLUTION INTRAVENOUS at 09:50

## 2020-03-06 RX ADMIN — LACTULOSE 60 G: 10 SOLUTION ORAL at 10:57

## 2020-03-06 NOTE — PLAN OF CARE
F/u this am for pt candidacy to participate in instrumental dysphagia evaluation. Received call from [place] who reports pt is in radiology suite for xray and is available for MBS while she is in radiology. D/w Ngoc Easton via telephone who clears pt to participate as pt is not scheduled for surgery on this date. SLP presents to radiology suite and observes pt in hospital bed, however hospital bed is too large to perform swallowing evaluation in the fluoro room. Per this status, attempted to transfer pt to vess chair to perform instrumental MBS. Pt adamantly refuses to be transferred to vess chair to participate. This is consistent w/ what was observed yesterday pm when attempted to transport pt to radiology suite. Per this status, pt refusal MBS deferred at this time.     D/w Dr. Dela Cruz via [employer] pt's significant h/o dysphagia and SLP unable to recommend safe po diet at this time until instrumental dysphagia evaluation is performed. SLP hopeful pt will be able to tolerate transfer to vess chair after surgery is completed.     Per this status SLP recommends to continue NPO status w/ strict oral care Q4 w/ suction 2/2 prolonged NPO status. Meds via non-oral method. Universal aspiration precautions.    SLP to f/u daily for pt status to functionally participate in instrumental dysphagia evaluation.    Thank you for allowing me to participate in the care of your patient-  Nicki Leiva M.S., CCC-SLP

## 2020-03-06 NOTE — PROGRESS NOTES
Discharge Planning Assessment   Antony     Patient Name: Bere Caal  MRN: [id number]  Today's Date: 3/6/2020    Admit Date: 3/5/2020    Discharge Plan     Row Name 03/06/20 1324       Plan    Plan SS was admitted from [facility] and had a 14 day skilled bed hold upon admission. State Guardian, Татьяна Chavis needs to be notified at discharge. SS to follow.         Destination      Service Provider Request Status Selected Services Address Phone Number Fax Number    [facility] Pending - No Request Sent N/A [address] [place] [id number] [phone]     JOHANNY Mayes

## 2020-03-06 NOTE — PROGRESS NOTES
Antimicrobial Length of Therapy:    Day 2 of 6 ceftriaxone  Day 1 of 5 metronidazole    Thank you.  Emelia French, Pharm.D.  3/6/2020  4:05 PM

## 2020-03-06 NOTE — PROGRESS NOTES
[facility] HOSPITALIST PROGRESS NOTE     Patient Identification:  Name:  Bere Caal  Age:  64 y.o.  Sex:  female  :  1955  MRN:  [id number]  Visit Number:  [id number]  ROOM: [address] [place] [address]     Primary Care Provider:  Kris Garcia MD    Length of stay:  1    Subjective        Chief Compliant Follow up for sepsis and fracture    Patient seen and examined this afternoon with no family at bedside.  Awake and moaning.  Patient is nonverbal so review of system is very limited.  Tachycardia much improved.  No episodes of fever.  Had speech evaluation this morning and advised n.p.o. status.  No events overnight. On RA.       Objective     Current Hospital Meds:    cefTRIAXone 2 g Intravenous Q24H   famotidine 20 mg Intravenous Q12H   folic acid 1 mg Oral Daily   hyoscyamine 125 mcg Sublingual Nightly   lactobacillus acidophilus 1 capsule Oral Daily   lactulose 60 g Rectal TID   levETIRAcetam 250 mg Intravenous Q12H   metoprolol tartrate 25 mg Oral Q12H   metroNIDAZOLE 500 mg Intravenous Q8H   PARoxetine 10 mg Oral QAM   primidone 250 mg Oral TID   sodium chloride 10 mL Intravenous Q12H       dextrose 5 % and sodium chloride 0.9 % 50 mL/hr Last Rate: 50 mL/hr (20 0955)     ----------------------------------------------------------------------------------------------------------------------  Vital Signs:  Temp:  [98.5 °F (36.9 °C)-99.9 °F (37.7 °C)] 98.5 °F (36.9 °C)  Heart Rate:  [] 106  Resp:  [20] 20  BP: ()/(55-66) 109/62  SpO2:  [94 %-96 %] 95 %  on   ;   Device (Oxygen Therapy): room air  Body mass index is 25.94 kg/m².    Wt Readings from Last 3 Encounters:   20 68.5 kg (151 lb 1.6 oz)       Intake/Output Summary (Last 24 hours) at 3/6/2020 1742  Last data filed at 3/6/2020 1540  Gross per 24 hour   Intake 0 ml   Output 550 ml   Net -550 ml     NPO  Diet  ----------------------------------------------------------------------------------------------------------------------  Physical exam:  General: Comfortable, Not in distress.  Well-developed and well-nourished.   HENT:  Head:  Normocephalic and atraumatic.  Mouth: dry mucous membranes.    Eyes:  Conjunctivae and EOM are normal.  Pupils are equal, round, and reactive to light.  No scleral icterus.    Neck:  Neck supple.  No JVD present.    Cardiovascular:  tachycardia, irregularly irregular rhythm with no murmur.  Pulmonary/Chest:  No respiratory distress, no wheezes, no crackles, with normal breath sounds and good air movement.  Abdomen:  Soft.  Bowel sounds are normal.  No distension and no tenderness. Previous G Tube site.   Musculoskeletal:  Contractures noted in the the B/L upper and the lower extremities . no tenderness.  No red or swollen joints anywhere.    Neurological:  Awake, eyes open but non verbal.   Skin:  Skin is warm and dry. No rash noted. No pallor.   Peripheral vascular: pulses in all 4 extremities with no clubbing, no cyanosis, no edema.  Genitourinary: hutton +  ----------------------------------------------------------------------------------------------------------------------  ----------------------------------------------------------------------------------------------------------------------  Results from last 7 days   Lab Units 03/06/20 0413 03/05/20 0926 03/05/20 0429   CRP mg/dL 12.77*  --  7.46*   LACTATE mmol/L  --   --  1.2   WBC 10*3/mm3 4.80  --  4.32   HEMOGLOBIN g/dL 8.9* 10.4* 9.7*   HEMATOCRIT % 29.5* 33.0* 30.9*   MCV fL 89.9  --  84.4   MCHC g/dL 30.2*  --  31.4*   PLATELETS 10*3/mm3 133*  --  153   INR   --  1.12*  --      Results from last 7 days   Lab Units 03/06/20 0413 03/05/20 0429   SODIUM mmol/L 135* 137   POTASSIUM mmol/L 3.8 4.1   MAGNESIUM mg/dL 2.0 1.6   CHLORIDE mmol/L 103 100   CO2 mmol/L 23.4 25.0   BUN mg/dL 8 13   CREATININE mg/dL 0.39* 0.43*    PHOSPHORUS mg/dL  --  2.8   EGFR IF NONAFRICN AM mL/min/1.73 >150 149   CALCIUM mg/dL 8.0* 8.7   GLUCOSE mg/dL 131* 115*   ALBUMIN g/dL 2.53* 2.80*   BILIRUBIN mg/dL 0.9 0.6   ALK PHOS U/L 194* 248*   AST (SGOT) U/L 101* 180*   ALT (SGPT) U/L 33 47*   Estimated Creatinine Clearance: 142.1 mL/min (A) (by C-G formula based on SCr of 0.39 mg/dL (L)).  Results from last 7 days   Lab Units 03/05/20  0429   CK TOTAL U/L 166   TROPONIN T ng/mL <0.010     Glucose   Date/Time Value Ref Range Status   03/06/2020 1638 103 70 - 130 mg/dL Final   03/06/2020 1251 101 70 - 130 mg/dL Final   03/06/2020 0943 96 70 - 130 mg/dL Final   03/06/2020 0442 140 (H) 70 - 130 mg/dL Final   03/05/2020 2359 106 70 - 130 mg/dL Final   03/05/2020 2045 127 70 - 130 mg/dL Final   03/05/2020 1710 93 70 - 130 mg/dL Final   03/05/2020 1213 74 70 - 130 mg/dL Final     Ammonia   Date Value Ref Range Status   03/06/2020 105 (H) 11 - 51 umol/L Final   03/05/2020 75 (H) 11 - 51 umol/L Final       Results from last 7 days   Lab Units 03/05/20  0452   NITRITE UA  Positive*   WBC UA /HPF 31-50*   BACTERIA UA /HPF 4+*   SQUAM EPITHEL UA /HPF 7-12*   URINECX  >100,000 CFU/mL Escherichia coli*  >100,000 CFU/mL Mixed Connie Isolated     Blood Culture   Date Value Ref Range Status   03/05/2020 No growth at 24 hours  Preliminary   03/05/2020 No growth at 24 hours  Preliminary   No results found for: RESPCX  Urine Culture   Date Value Ref Range Status   03/05/2020 >100,000 CFU/mL Escherichia coli (A)  Preliminary   03/05/2020 >100,000 CFU/mL Mixed Connie Isolated  Preliminary   No results found for: WOUNDCXNo results found for: BODYFLDCXNo results found for: STOOLCX  I have personally looked at the labs and they are summarized above.  ----------------------------------------------------------------------------------------------------------------------  Imaging Results (Last 24 Hours)     Procedure Component Value Units Date/Time    XR Femur 2 View Left [[id number]]  Collected:  03/06/20 0834     Updated:  03/06/20 0836    Narrative:       EXAMINATION: XR FEMUR 2 VW LEFT-      TECHNIQUE: XR FEMUR 2 VW LEFT-        INDICATION: displaced distal femur fracture; S72.22XA-Displaced  subtrochanteric fracture of left femur, initial encounter for closed  fracture; N30.00-Acute cystitis without hematuria      COMPARISON: NONE     FINDINGS:          BONES: MODERATELY DISPLACED AND ANGULATED FRACTURE OF THE  MID-DISTAL FEMUR SHAFT.          JOINT: No dislocation.          SOFT TISSUES:  No soft tissue mass.       Impression:       MODERATELY DISPLACED AND ANGULATED FRACTURE OF THE  MID-DISTAL FEMUR SHAFT.     COMMUNICATION: Per this written report.     This report was finalized on 3/6/2020 8:34 AM by Dr. Mario Brasher MD.       XR Chest 1 View [[id number]] Collected:  03/06/20 0833     Updated:  03/06/20 0836    Narrative:       EXAMINATION: XR CHEST 1 VW-      CLINICAL INDICATION:     Sepsis; S72.22XA-Displaced subtrochanteric  fracture of left femur, initial encounter for closed fracture;  N30.00-Acute cystitis without hematuria     TECHNIQUE:  XR CHEST 1 VW-      COMPARISON: NONE      FINDINGS:   Coarsened interstitial markings. Heart and mediastinum contours are  unremarkable.  No pleural effusion.  No pneumothorax.     Impression:       Proximal humerus hardware noted. IMPRESSION: No radiographic evidence of  acute cardiac or pulmonary disease.        This report was finalized on 3/6/2020 8:34 AM by Dr. Mario Brasher MD.       US Liver [[id number]] Collected:  03/06/20 0829     Updated:  03/06/20 0832    Narrative:       EXAMINATION: US LIVER-         CLINICAL INDICATION:     hyperammonemia; S72.22XA-Displaced  subtrochanteric fracture of left femur, initial encounter for closed  fracture; N30.00-Acute cystitis without hematuria     TECHNIQUE: Multiplanar gray scale ultrasound of the abdomen.     COMPARISON: NONE      FINDINGS:   Visualized pancreas is unremarkable.      The CBD  measures 3.48 mm .  The liver demonstrates normal echogenicity without focal lesion.    No ascites demonstrated.   There is no sonographic Quintanilla sign.       Impression:       No sonographic findings to explain abdominal pain.      This report was finalized on 3/6/2020 8:30 AM by Dr. Mario Brasher MD.           I have personally reviewed the radiology images and read the final radiology report.    Assessment & Plan      Assessment and Plan:     Displaced oblique distal left femur fracture, with mild hemorrhage in surrounding muscle: NPO, orthopedic surgery on board. Neurovascular checks every 4 hours. PRN Pain medication. Hold eliquis.      Sepsis with UTI (, temp 100.1, CRP 7.46): Continue with iv rocephin 2gm IV daily and iv flagyl and lactobacillus. Urine cx with >100 K E.coli. prelim blood culture are negative. xray chest reviewed.  Currently patient is afebrile and VSS.  No leukocytosis and CRP trending up.  Repeat labs in the am.      Non cirrhotic Hyperammonemia. Worsening. Continue with rectal  lactulose. Liver enzymes improving. US liver normal liver. hepatitis B surface antigen +. Hepatitis core IgM negative. Will check hepatitis surface antibody and hepatitis E antigen. Elevated Transaminases and alkaline phosphatase Improving. Hold tylenol.      Normocytic anemia: HGB IS 8.9. Check iron profile, ferritin, B12 and folate.      Atrial Fibrillation, chronicity unknown, with RVR: HR controlled. On lopressor. She is on elqiuis for stroke prevention, last dose per the nursing home staff was 3/4/2020 at 2000. Hold coreg and eliquis.      Hemiplegia and hemiparesis, hx of nontraumatic intracerebral hemorrhage, non verbal and bed ridden: turn q2hr, wound care for any skin breakdown. Supportive care. CT head with chronic changes.      Essential hypertension: Hold home coreg since BP borderline. On lopressor.       Dysphasia: Continue NPO per SLP. utilizes modified diet at the nursing home, puree with nectar  thick liquids, have SLP evaluate after surgery. On D5W and continue with accuchecks.     Hx of Seizures: continue keppra iv, and primidone seizure precautions. No recent seizure episodes per the nursing staff.      Activity: bedrest, turn q2hr   Tubes/Lines/Drains: hutton cath  Diet: NPO, gentle IV fluids for hydration, accu checks   DVT prophylaxis: SCD to right leg  GI prophylaxis: iv pepcid      Patient is high risk for the following reasons: UTI, Femur Fracture, IV pain medication       Gabriella Dela Cruz MD  03/06/20  5:42 PM

## 2020-03-06 NOTE — PROGRESS NOTES
Brief progress note:  Plain film x-rays of the left femur was obtained yesterday.  Dr. Dumont is is the oncoming orthopedic surgeon.  He will evaluate plain film x-rays and dictate further plan of care.  The patient is a [place] state.  Will need to discuss with her  prior to surgical intervention being ordered.  Dr. Dumont will see and evaluate the patient as well as review plain film x-rays for further recommendations.  Patient has been made n.p.o. as per recommended by speech therapy.  Eliquis is being held by hospitalist service.  Neurovascularly the patient is intact to the left lower extremity with no change in physical examination from prior note.  Orthopedic surgery will continue to follow the patient closely.

## 2020-03-06 NOTE — PLAN OF CARE
Pt has rested with eyes closed throughout the night. Pt did become tachy while moving positions in the bed. Vital signs WNL. No acute changes.

## 2020-03-07 ENCOUNTER — ANESTHESIA EVENT (OUTPATIENT)
Dept: PERIOP | Facility: HOSPITAL | Age: 65
End: 2020-03-07

## 2020-03-07 ENCOUNTER — APPOINTMENT (OUTPATIENT)
Dept: GENERAL RADIOLOGY | Facility: HOSPITAL | Age: 65
End: 2020-03-07

## 2020-03-07 ENCOUNTER — ANESTHESIA (OUTPATIENT)
Dept: PERIOP | Facility: HOSPITAL | Age: 65
End: 2020-03-07

## 2020-03-07 LAB
ALBUMIN SERPL-MCNC: 2.56 G/DL (ref 3.5–5.2)
ALP SERPL-CCNC: 175 U/L (ref 39–117)
ALT SERPL W P-5'-P-CCNC: 27 U/L (ref 1–33)
AMMONIA BLD-SCNC: 59 UMOL/L (ref 11–51)
ANION GAP SERPL CALCULATED.3IONS-SCNC: 13.6 MMOL/L (ref 5–15)
AST SERPL-CCNC: 64 U/L (ref 1–32)
BACTERIA SPEC AEROBE CULT: ABNORMAL
BACTERIA SPEC AEROBE CULT: ABNORMAL
BASOPHILS # BLD AUTO: 0.02 10*3/MM3 (ref 0–0.2)
BASOPHILS NFR BLD AUTO: 0.5 % (ref 0–1.5)
BILIRUB CONJ SERPL-MCNC: 0.6 MG/DL (ref 0.2–0.3)
BILIRUB INDIRECT SERPL-MCNC: 0.4 MG/DL
BILIRUB SERPL-MCNC: 1 MG/DL (ref 0.2–1.2)
BUN BLD-MCNC: 8 MG/DL (ref 8–23)
BUN/CREAT SERPL: 19.5 (ref 7–25)
CALCIUM SPEC-SCNC: 8.4 MG/DL (ref 8.6–10.5)
CHLORIDE SERPL-SCNC: 103 MMOL/L (ref 98–107)
CO2 SERPL-SCNC: 21.4 MMOL/L (ref 22–29)
CREAT BLD-MCNC: 0.41 MG/DL (ref 0.57–1)
CRP SERPL-MCNC: 14.61 MG/DL (ref 0–0.5)
DEPRECATED RDW RBC AUTO: 58.6 FL (ref 37–54)
EOSINOPHIL # BLD AUTO: 0.02 10*3/MM3 (ref 0–0.4)
EOSINOPHIL NFR BLD AUTO: 0.5 % (ref 0.3–6.2)
ERYTHROCYTE [DISTWIDTH] IN BLOOD BY AUTOMATED COUNT: 18.1 % (ref 12.3–15.4)
GFR SERPL CREATININE-BSD FRML MDRD: >150 ML/MIN/1.73
GLUCOSE BLD-MCNC: 105 MG/DL (ref 65–99)
GLUCOSE BLDC GLUCOMTR-MCNC: 102 MG/DL (ref 70–130)
GLUCOSE BLDC GLUCOMTR-MCNC: 114 MG/DL (ref 70–130)
GLUCOSE BLDC GLUCOMTR-MCNC: 115 MG/DL (ref 70–130)
GLUCOSE BLDC GLUCOMTR-MCNC: 116 MG/DL (ref 70–130)
GLUCOSE BLDC GLUCOMTR-MCNC: 98 MG/DL (ref 70–130)
HCT VFR BLD AUTO: 30.6 % (ref 34–46.6)
HGB BLD-MCNC: 9.3 G/DL (ref 12–15.9)
IMM GRANULOCYTES # BLD AUTO: 0.02 10*3/MM3 (ref 0–0.05)
IMM GRANULOCYTES NFR BLD AUTO: 0.5 % (ref 0–0.5)
LYMPHOCYTES # BLD AUTO: 0.91 10*3/MM3 (ref 0.7–3.1)
LYMPHOCYTES NFR BLD AUTO: 21 % (ref 19.6–45.3)
MCH RBC QN AUTO: 26.9 PG (ref 26.6–33)
MCHC RBC AUTO-ENTMCNC: 30.4 G/DL (ref 31.5–35.7)
MCV RBC AUTO: 88.4 FL (ref 79–97)
MONOCYTES # BLD AUTO: 0.49 10*3/MM3 (ref 0.1–0.9)
MONOCYTES NFR BLD AUTO: 11.3 % (ref 5–12)
NEUTROPHILS # BLD AUTO: 2.87 10*3/MM3 (ref 1.7–7)
NEUTROPHILS NFR BLD AUTO: 66.2 % (ref 42.7–76)
NRBC BLD AUTO-RTO: 0 /100 WBC (ref 0–0.2)
PLATELET # BLD AUTO: 129 10*3/MM3 (ref 140–450)
PMV BLD AUTO: 10 FL (ref 6–12)
POTASSIUM BLD-SCNC: 3.8 MMOL/L (ref 3.5–5.2)
PROT SERPL-MCNC: 6.5 G/DL (ref 6–8.5)
RBC # BLD AUTO: 3.46 10*6/MM3 (ref 3.77–5.28)
SODIUM BLD-SCNC: 138 MMOL/L (ref 136–145)
WBC NRBC COR # BLD: 4.33 10*3/MM3 (ref 3.4–10.8)

## 2020-03-07 PROCEDURE — C1769 GUIDE WIRE: HCPCS | Performed by: ORTHOPAEDIC SURGERY

## 2020-03-07 PROCEDURE — 76000 FLUOROSCOPY <1 HR PHYS/QHP: CPT | Performed by: RADIOLOGY

## 2020-03-07 PROCEDURE — 86140 C-REACTIVE PROTEIN: CPT | Performed by: HOSPITALIST

## 2020-03-07 PROCEDURE — C1713 ANCHOR/SCREW BN/BN,TIS/BN: HCPCS | Performed by: ORTHOPAEDIC SURGERY

## 2020-03-07 PROCEDURE — 25010000002 MIDAZOLAM PER 1 MG: Performed by: ANESTHESIOLOGY

## 2020-03-07 PROCEDURE — 25010000002 LEVETRIRACETAM PER 10 MG: Performed by: ORTHOPAEDIC SURGERY

## 2020-03-07 PROCEDURE — 80048 BASIC METABOLIC PNL TOTAL CA: CPT | Performed by: HOSPITALIST

## 2020-03-07 PROCEDURE — 82962 GLUCOSE BLOOD TEST: CPT

## 2020-03-07 PROCEDURE — 85025 COMPLETE CBC W/AUTO DIFF WBC: CPT | Performed by: HOSPITALIST

## 2020-03-07 PROCEDURE — 25010000002 KETOROLAC TROMETHAMINE PER 15 MG: Performed by: ANESTHESIOLOGY

## 2020-03-07 PROCEDURE — 25010000002 DIGOXIN PER 500 MCG: Performed by: HOSPITALIST

## 2020-03-07 PROCEDURE — 25010000002 CEFTRIAXONE: Performed by: ORTHOPAEDIC SURGERY

## 2020-03-07 PROCEDURE — 25010000002 LEVETRIRACETAM PER 10 MG: Performed by: HOSPITALIST

## 2020-03-07 PROCEDURE — 25010000003 POTASSIUM CHLORIDE 10 MEQ/100ML SOLUTION: Performed by: HOSPITALIST

## 2020-03-07 PROCEDURE — 99232 SBSQ HOSP IP/OBS MODERATE 35: CPT | Performed by: HOSPITALIST

## 2020-03-07 PROCEDURE — 80076 HEPATIC FUNCTION PANEL: CPT | Performed by: HOSPITALIST

## 2020-03-07 PROCEDURE — 25010000002 SUCCINYLCHOLINE PER 20 MG: Performed by: ANESTHESIOLOGY

## 2020-03-07 PROCEDURE — 82140 ASSAY OF AMMONIA: CPT | Performed by: HOSPITALIST

## 2020-03-07 PROCEDURE — 25010000002 PROPOFOL 10 MG/ML EMULSION: Performed by: ANESTHESIOLOGY

## 2020-03-07 PROCEDURE — 0QS704Z REPOSITION LEFT UPPER FEMUR WITH INTERNAL FIXATION DEVICE, OPEN APPROACH: ICD-10-PCS | Performed by: ORTHOPAEDIC SURGERY

## 2020-03-07 PROCEDURE — 76000 FLUOROSCOPY <1 HR PHYS/QHP: CPT

## 2020-03-07 DEVICE — IMPLANTABLE DEVICE: Type: IMPLANTABLE DEVICE | Status: FUNCTIONAL

## 2020-03-07 DEVICE — ENDCP NAIL FEM RETRGR EXSPL T40STRDRV 0: Type: IMPLANTABLE DEVICE | Status: FUNCTIONAL

## 2020-03-07 DEVICE — SCRW LK STRDRV TI 5X34MM STRL: Type: IMPLANTABLE DEVICE | Status: FUNCTIONAL

## 2020-03-07 RX ORDER — DIGOXIN 0.25 MG/ML
250 INJECTION INTRAMUSCULAR; INTRAVENOUS ONCE
Status: DISCONTINUED | OUTPATIENT
Start: 2020-03-07 | End: 2020-03-07

## 2020-03-07 RX ORDER — IPRATROPIUM BROMIDE AND ALBUTEROL SULFATE 2.5; .5 MG/3ML; MG/3ML
3 SOLUTION RESPIRATORY (INHALATION) ONCE AS NEEDED
Status: DISCONTINUED | OUTPATIENT
Start: 2020-03-07 | End: 2020-03-07 | Stop reason: HOSPADM

## 2020-03-07 RX ORDER — SODIUM CHLORIDE 0.9 % (FLUSH) 0.9 %
10 SYRINGE (ML) INJECTION AS NEEDED
Status: DISCONTINUED | OUTPATIENT
Start: 2020-03-07 | End: 2020-03-07 | Stop reason: HOSPADM

## 2020-03-07 RX ORDER — DIGOXIN 0.25 MG/ML
250 INJECTION INTRAMUSCULAR; INTRAVENOUS ONCE
Status: COMPLETED | OUTPATIENT
Start: 2020-03-07 | End: 2020-03-07

## 2020-03-07 RX ORDER — PROPOFOL 10 MG/ML
VIAL (ML) INTRAVENOUS AS NEEDED
Status: DISCONTINUED | OUTPATIENT
Start: 2020-03-07 | End: 2020-03-07 | Stop reason: SURG

## 2020-03-07 RX ORDER — KETOROLAC TROMETHAMINE 30 MG/ML
INJECTION, SOLUTION INTRAMUSCULAR; INTRAVENOUS AS NEEDED
Status: DISCONTINUED | OUTPATIENT
Start: 2020-03-07 | End: 2020-03-07 | Stop reason: SURG

## 2020-03-07 RX ORDER — SODIUM CHLORIDE 0.9 % (FLUSH) 0.9 %
10 SYRINGE (ML) INJECTION EVERY 12 HOURS SCHEDULED
Status: DISCONTINUED | OUTPATIENT
Start: 2020-03-07 | End: 2020-03-07 | Stop reason: HOSPADM

## 2020-03-07 RX ORDER — MEPERIDINE HYDROCHLORIDE 25 MG/ML
12.5 INJECTION INTRAMUSCULAR; INTRAVENOUS; SUBCUTANEOUS
Status: DISCONTINUED | OUTPATIENT
Start: 2020-03-07 | End: 2020-03-07 | Stop reason: HOSPADM

## 2020-03-07 RX ORDER — METOPROLOL TARTRATE 5 MG/5ML
5 INJECTION INTRAVENOUS ONCE
Status: COMPLETED | OUTPATIENT
Start: 2020-03-07 | End: 2020-03-07

## 2020-03-07 RX ORDER — ONDANSETRON 2 MG/ML
4 INJECTION INTRAMUSCULAR; INTRAVENOUS AS NEEDED
Status: DISCONTINUED | OUTPATIENT
Start: 2020-03-07 | End: 2020-03-07 | Stop reason: HOSPADM

## 2020-03-07 RX ORDER — FENTANYL CITRATE 50 UG/ML
50 INJECTION, SOLUTION INTRAMUSCULAR; INTRAVENOUS
Status: DISCONTINUED | OUTPATIENT
Start: 2020-03-07 | End: 2020-03-07 | Stop reason: HOSPADM

## 2020-03-07 RX ORDER — SUCCINYLCHOLINE CHLORIDE 20 MG/ML
INJECTION INTRAMUSCULAR; INTRAVENOUS AS NEEDED
Status: DISCONTINUED | OUTPATIENT
Start: 2020-03-07 | End: 2020-03-07 | Stop reason: SURG

## 2020-03-07 RX ORDER — POTASSIUM CHLORIDE 7.45 MG/ML
10 INJECTION INTRAVENOUS
Status: COMPLETED | OUTPATIENT
Start: 2020-03-07 | End: 2020-03-07

## 2020-03-07 RX ORDER — MAGNESIUM HYDROXIDE 1200 MG/15ML
LIQUID ORAL AS NEEDED
Status: DISCONTINUED | OUTPATIENT
Start: 2020-03-07 | End: 2020-03-07 | Stop reason: HOSPADM

## 2020-03-07 RX ORDER — OXYCODONE HYDROCHLORIDE AND ACETAMINOPHEN 5; 325 MG/1; MG/1
1 TABLET ORAL ONCE AS NEEDED
Status: DISCONTINUED | OUTPATIENT
Start: 2020-03-07 | End: 2020-03-07 | Stop reason: HOSPADM

## 2020-03-07 RX ORDER — HEPARIN SODIUM 5000 [USP'U]/ML
5000 INJECTION, SOLUTION INTRAVENOUS; SUBCUTANEOUS EVERY 12 HOURS SCHEDULED
Status: DISCONTINUED | OUTPATIENT
Start: 2020-03-07 | End: 2020-03-07

## 2020-03-07 RX ORDER — SODIUM CHLORIDE, SODIUM LACTATE, POTASSIUM CHLORIDE, CALCIUM CHLORIDE 600; 310; 30; 20 MG/100ML; MG/100ML; MG/100ML; MG/100ML
125 INJECTION, SOLUTION INTRAVENOUS CONTINUOUS
Status: DISCONTINUED | OUTPATIENT
Start: 2020-03-07 | End: 2020-03-07

## 2020-03-07 RX ORDER — LACTULOSE 10 G/15ML
30 SOLUTION ORAL 3 TIMES DAILY
Status: DISCONTINUED | OUTPATIENT
Start: 2020-03-07 | End: 2020-03-08

## 2020-03-07 RX ORDER — MIDAZOLAM HYDROCHLORIDE 1 MG/ML
INJECTION INTRAMUSCULAR; INTRAVENOUS AS NEEDED
Status: DISCONTINUED | OUTPATIENT
Start: 2020-03-07 | End: 2020-03-07 | Stop reason: SURG

## 2020-03-07 RX ADMIN — METRONIDAZOLE 500 MG: 500 INJECTION, SOLUTION INTRAVENOUS at 05:19

## 2020-03-07 RX ADMIN — METOPROLOL TARTRATE 5 MG: 1 INJECTION, SOLUTION INTRAVENOUS at 01:19

## 2020-03-07 RX ADMIN — DEXTROSE AND SODIUM CHLORIDE 50 ML/HR: 5; 900 INJECTION, SOLUTION INTRAVENOUS at 17:02

## 2020-03-07 RX ADMIN — SODIUM CHLORIDE 250 MG: 9 INJECTION, SOLUTION INTRAVENOUS at 21:20

## 2020-03-07 RX ADMIN — HYOSCYAMINE SULFATE 125 MCG: 0.12 TABLET, ORALLY DISINTEGRATING ORAL at 21:20

## 2020-03-07 RX ADMIN — PROPOFOL 50 MG: 10 INJECTION, EMULSION INTRAVENOUS at 15:30

## 2020-03-07 RX ADMIN — DIGOXIN 250 MCG: 0.25 INJECTION INTRAMUSCULAR; INTRAVENOUS at 18:10

## 2020-03-07 RX ADMIN — POTASSIUM CHLORIDE 10 MEQ: 7.46 INJECTION, SOLUTION INTRAVENOUS at 18:45

## 2020-03-07 RX ADMIN — SODIUM CHLORIDE, PRESERVATIVE FREE 10 ML: 5 INJECTION INTRAVENOUS at 08:52

## 2020-03-07 RX ADMIN — FAMOTIDINE 20 MG: 10 INJECTION INTRAVENOUS at 08:53

## 2020-03-07 RX ADMIN — CEFTRIAXONE 2 G: 2 INJECTION, POWDER, FOR SOLUTION INTRAMUSCULAR; INTRAVENOUS at 14:56

## 2020-03-07 RX ADMIN — POTASSIUM CHLORIDE 10 MEQ: 7.46 INJECTION, SOLUTION INTRAVENOUS at 20:40

## 2020-03-07 RX ADMIN — PROPOFOL 150 MG: 10 INJECTION, EMULSION INTRAVENOUS at 14:06

## 2020-03-07 RX ADMIN — LACTULOSE 60 G: 10 SOLUTION ORAL at 08:54

## 2020-03-07 RX ADMIN — PROPOFOL 50 MG: 10 INJECTION, EMULSION INTRAVENOUS at 15:13

## 2020-03-07 RX ADMIN — PROPOFOL 50 MG: 10 INJECTION, EMULSION INTRAVENOUS at 13:56

## 2020-03-07 RX ADMIN — SODIUM CHLORIDE 250 MG: 9 INJECTION, SOLUTION INTRAVENOUS at 08:57

## 2020-03-07 RX ADMIN — MIDAZOLAM HYDROCHLORIDE 2 MG: 1 INJECTION, SOLUTION INTRAMUSCULAR; INTRAVENOUS at 15:06

## 2020-03-07 RX ADMIN — POTASSIUM CHLORIDE 10 MEQ: 7.46 INJECTION, SOLUTION INTRAVENOUS at 16:46

## 2020-03-07 RX ADMIN — LACTULOSE 30 G: 10 SOLUTION ORAL at 21:20

## 2020-03-07 RX ADMIN — FAMOTIDINE 20 MG: 10 INJECTION INTRAVENOUS at 21:20

## 2020-03-07 RX ADMIN — KETOROLAC TROMETHAMINE 30 MG: 30 INJECTION, SOLUTION INTRAMUSCULAR; INTRAVENOUS at 14:27

## 2020-03-07 RX ADMIN — POTASSIUM CHLORIDE 10 MEQ: 7.46 INJECTION, SOLUTION INTRAVENOUS at 17:52

## 2020-03-07 RX ADMIN — METRONIDAZOLE 500 MG: 500 INJECTION, SOLUTION INTRAVENOUS at 23:00

## 2020-03-07 RX ADMIN — SUCCINYLCHOLINE CHLORIDE 100 MG: 20 INJECTION, SOLUTION INTRAMUSCULAR; INTRAVENOUS at 14:07

## 2020-03-07 NOTE — ANESTHESIA POSTPROCEDURE EVALUATION
Patient: Bere Caal    Procedure Summary     Date:  03/07/20 Room / Location:   [facility] OR 01 /  [facility]    Anesthesia Start:  1405 Anesthesia Stop:      Procedures:       Left femur open reduction internal fixation (Left )      FEMUR INTRAMEDULLARY NAILING RETROGRADE (Left Thigh) Diagnosis:       Closed displaced subtrochanteric fracture of left femur, initial encounter (CMS/[facility])      (Closed displaced subtrochanteric fracture of left femur, initial encounter (CMS/[facility]) [S72.22XA])    Surgeon:  Modesto Dumont MD Provider:  Sammy German MD    Anesthesia Type:  general ASA Status:  3          Anesthesia Type: general    Vitals  Vitals Value Taken Time   /93 3/7/2020  3:56 PM   Temp 97 °F (36.1 °C) 3/7/2020  3:56 PM   Pulse 117 3/7/2020  3:56 PM   Resp 15 3/7/2020  3:56 PM   SpO2 100 % 3/7/2020  3:56 PM           Post Anesthesia Care and Evaluation    Patient location during evaluation: PHASE II  Patient participation: complete - patient participated  Level of consciousness: awake and alert  Pain score: 0  Pain management: adequate  Airway patency: patent  Anesthetic complications: No anesthetic complications    Cardiovascular status: acceptable  Respiratory status: acceptable  Hydration status: acceptable

## 2020-03-07 NOTE — OP NOTE
Operative Note    Bere Caal  3/7/2020      Procedure(s): Open reduction and internal fixation left femur displaced fracture subtrochanteric.    Surgeon: Modesto Soler MD    Pre-Operative Diagnosis: Closed displaced subtrochanteric fracture of left femur, initial encounter (CMS/[facility]) [S72.22XA]    Post-Operative Diagnosis: Same    Type of Anesthesia Administered: General    Estimated Blood Loss: 50 cc    Blood Products: None    Specimen Obtained/Removed: None    Complication(s):  None    Graft/Implant/Prosthetics/Implanted Device/Transplants: Retrograde femoral nail from [employer]    Operative Report:    Patient under general anesthesia.  Lying supine on the OR table.  Prepping and draping of her left lower extremity.  At approximately 45 degree left knee is bent on a triangle support .  I made a straight incision splitting the patellar tendon longitudinally.  Under C-arm guidance I placed a threaded guidepin in the intercondylar notch both in AP and lateral and I push it in the distal  femoral canal.  Then with traction the femur to be able to pass a long guidepin through the fracture but I had to use the reduction glenn and the traction to be able to adequately passed my long guidepin through the fracture and to anchor it in the proximal femur.  Use a 32 cm retrograde nail  that has a diameter of 13 mm the nail is pushed over to the guidepin until the nail is flush with the intercondylar notch.  Excellent reduction of the fracture both in AP and lateral.  I then locked the nail distally with 1 bicortical screw and one helicoidal blade of 75 mm.  The blade is locked from the end of the nail with locking screw and then also using the C arm I lock the nail  proximally with 1 bicortical screw 34 mm.  Clean the incisions with normal saline.We reapproximated the patellar tendon with Vicryl No. 1 as well as the fascia aarti distally where we inserted the blade.  .  We closed the subcu with Monocryl 2-0 and finally we  closed the skin with staples.  Sterile dressing.  Posterior long-leg Ortho-Glass splint well-padded.  Final.  Electronically Signed by: Modesto Soler MD  Date/Time: 3/7/2020 3:44 PM

## 2020-03-07 NOTE — PROGRESS NOTES
Inpatient Progress Note  Bere Caal  Date: 03/07/20  MRN: [id number]      Subjective:   Dr. Dumont reviewed plain film x-rays of the left femur.  He is recommending patient undergo open reduction internal fixation left femur.  Nursing obtain consent from my .  Patient is leyva of the [facility].  Hospitalist is cleared patient for surgical intervention today.  Patient's ammonia levels have improved.  Patient has been n.p.o.  Eliquis is been held for 48 hours.  Patient is more alert on today's examination.        Objective:    Vitals:    03/06/20 2300 03/07/20 0242 03/07/20 0705 03/07/20 1041   BP: 114/63 134/80 99/69 107/70   BP Location: Right arm Right arm Right arm Right arm   Patient Position: Lying Lying Lying Lying   Pulse: 110 102 104 96   Resp: 20 20 22 20   Temp: 100.2 °F (37.9 °C) 98.7 °F (37.1 °C) 97.1 °F (36.2 °C) 99.8 °F (37.7 °C)   TempSrc: Axillary Axillary Axillary Axillary   SpO2: 90% 91% 97% 97%   Weight:       Height:              Physical Exam:  Constitutional:  Well-developed and well-nourished.  No respiratory distress.        Musculoskeletal: Upon examination left lower extremity there is no large effusion noted.  Patient is tender to palpation to the left midshaft femur.  Neurovascularly intact to the left lower extremity.  With dorsiflexion plantarflexion intact to the left ankle.  Positive pedal pulse noted to the left lower extremity.  No cellulitis erythema or cyanosis noted.  Bruising noted to the anterior aspect of the tibia.    Labs:    Results from last 7 days   Lab Units 03/07/20  1044 03/07/20  0953 03/06/20  0413 03/05/20  0926 03/05/20  0429   CRP mg/dL 14.61*  --  12.77*  --  7.46*   LACTATE mmol/L  --   --   --   --  1.2   WBC 10*3/mm3  --  4.33 4.80  --  4.32   HEMOGLOBIN g/dL  --  9.3* 8.9* 10.4* 9.7*   HEMATOCRIT %  --  30.6* 29.5* 33.0* 30.9*   MCV fL  --  88.4 89.9  --  84.4   MCHC g/dL  --  30.4* 30.2*  --  31.4*   PLATELETS 10*3/mm3  --  129* 133*  --  153   INR   --    --   --  1.12*  --          Results from last 7 days   Lab Units 03/07/20  1044 03/06/20  0413 03/05/20  0429   SODIUM mmol/L 138 135* 137   POTASSIUM mmol/L 3.8 3.8 4.1   MAGNESIUM mg/dL  --  2.0 1.6   CHLORIDE mmol/L 103 103 100   CO2 mmol/L 21.4* 23.4 25.0   BUN mg/dL 8 8 13   CREATININE mg/dL 0.41* 0.39* 0.43*   EGFR IF NONAFRICN AM mL/min/1.73 >150 >150 149   CALCIUM mg/dL 8.4* 8.0* 8.7   GLUCOSE mg/dL 105* 131* 115*   ALBUMIN g/dL  --  2.53* 2.80*   BILIRUBIN mg/dL  --  0.9 0.6   ALK PHOS U/L  --  194* 248*   AST (SGOT) U/L  --  101* 180*   ALT (SGPT) U/L  --  33 47*   Estimated Creatinine Clearance: 135.2 mL/min (A) (by C-G formula based on SCr of 0.41 mg/dL (L)).  Ammonia   Date Value Ref Range Status   03/07/2020 59 (H) 11 - 51 umol/L Final   03/06/2020 105 (H) 11 - 51 umol/L Final   03/05/2020 75 (H) 11 - 51 umol/L Final     Results from last 7 days   Lab Units 03/05/20  0429   CK TOTAL U/L 166   TROPONIN T ng/mL <0.010     Results from last 7 days   Lab Units 03/06/20  0413   PROBNP pg/mL 1,002.0*     No results found for: HGBA1C  Lab Results   Component Value Date    TSH 2.150 03/05/2020    FREET4 1.56 03/05/2020         Pain Management Panel     There is no flowsheet data to display.          Blood Culture   Date Value Ref Range Status   03/05/2020 No growth at 2 days  Preliminary   03/05/2020 No growth at 2 days  Preliminary     Urine Culture   Date Value Ref Range Status   03/05/2020 >100,000 CFU/mL Escherichia coli (A)  Final   03/05/2020 >100,000 CFU/mL Mixed Connie Isolated  Final     No results found for: WOUNDCX  No results found for: STOOLCX              Radiology:  Imaging Results (Last 72 Hours)     Procedure Component Value Units Date/Time    XR Femur 2 View Left [[id number]] Collected:  03/06/20 0834     Updated:  03/06/20 0836    Narrative:       EXAMINATION: XR FEMUR 2 VW LEFT-      TECHNIQUE: XR FEMUR 2 VW LEFT-        INDICATION: displaced distal femur fracture;  S72.22XA-Displaced  subtrochanteric fracture of left femur, initial encounter for closed  fracture; N30.00-Acute cystitis without hematuria      COMPARISON: NONE     FINDINGS:          BONES: MODERATELY DISPLACED AND ANGULATED FRACTURE OF THE  MID-DISTAL FEMUR SHAFT.          JOINT: No dislocation.          SOFT TISSUES:  No soft tissue mass.       Impression:       MODERATELY DISPLACED AND ANGULATED FRACTURE OF THE  MID-DISTAL FEMUR SHAFT.     COMMUNICATION: Per this written report.     This report was finalized on 3/6/2020 8:34 AM by Dr. Mario Brasher MD.       XR Chest 1 View [[id number]] Collected:  03/06/20 0833     Updated:  03/06/20 0836    Narrative:       EXAMINATION: XR CHEST 1 VW-      CLINICAL INDICATION:     Sepsis; S72.22XA-Displaced subtrochanteric  fracture of left femur, initial encounter for closed fracture;  N30.00-Acute cystitis without hematuria     TECHNIQUE:  XR CHEST 1 VW-      COMPARISON: NONE      FINDINGS:   Coarsened interstitial markings. Heart and mediastinum contours are  unremarkable.  No pleural effusion.  No pneumothorax.     Impression:       Proximal humerus hardware noted. IMPRESSION: No radiographic evidence of  acute cardiac or pulmonary disease.        This report was finalized on 3/6/2020 8:34 AM by Dr. Mario Brasher MD.       US Liver [[id number]] Collected:  03/06/20 0829     Updated:  03/06/20 0832    Narrative:       EXAMINATION: US LIVER-         CLINICAL INDICATION:     hyperammonemia; S72.22XA-Displaced  subtrochanteric fracture of left femur, initial encounter for closed  fracture; N30.00-Acute cystitis without hematuria     TECHNIQUE: Multiplanar gray scale ultrasound of the abdomen.     COMPARISON: NONE      FINDINGS:   Visualized pancreas is unremarkable.      The CBD measures 3.48 mm .  The liver demonstrates normal echogenicity without focal lesion.    No ascites demonstrated.   There is no sonographic Quintanilla sign.       Impression:       No sonographic findings to  explain abdominal pain.      This report was finalized on 3/6/2020 8:30 AM by Dr. Mario Brasher MD.       CT Head Without Contrast [[id number]] Collected:  03/05/20 1050     Updated:  03/05/20 1053    Narrative:          EXAMINATION: CT HEAD WO CONTRAST-      CLINICAL INDICATION:     AMS; S72.22XA-Displaced subtrochanteric  fracture of left femur, initial encounter for closed fracture;  N30.00-Acute cystitis without hematuria     TECHNIQUE: Contiguous axial CT images of the head were obtained without  contrast administration.      Radiation dose reduction techniques were utilized per ALARA protocol.  Automated exposure control was initiated through either or [employer] or  DoseRight software packages by  protocol.       1094.37 mGy.cm     COMPARISON:  None.       FINDINGS: Generalized cerebral atrophy is present. There is no mass  effect, midline displacement, or hydrocephalus. Right periventricular  white matter change greater than left suggestive of chronic small vessel  ischemic change. There is no CT evidence of acute infarct or hemorrhage.  Bone windows reveal no osseous abnormalities or fractures.        Impression:       Right periventricular white matter change greater than left  suggestive of chronic small vessel ischemic change.     This report was finalized on 3/5/2020 10:51 AM by Dr. Mario Brasher MD.       CT Lower Extremity Bilateral Without Contrast [[id number]] Collected:  03/05/20 0245     Updated:  03/05/20 0247    Narrative:       CT LE BILAT WO    INDICATION:    Pain and left leg after injury tonight at nursing home    TECHNIQUE:   CT of the lower extremities without IV contrast. Coronal and sagittal reconstructions were obtained.  Radiation dose reduction techniques included automated exposure control or exposure modulation based on body size. Count of known CT and cardiac nuc med  studies performed in previous 12 months: 0.      COMPARISON:    None available.    FINDINGS:  There is an  oblique markedly displaced fractures of the distal third of the left femoral shaft. Mild hemorrhage in the surrounding muscle. The right femur is normal.      Impression:       Acute displaced oblique distal left femur fracture    Signer Name: Arturo Reyes MD   Signed: 3/5/2020 2:45 AM   Work[facility]    Radiology [facility]    CT Pelvis Without Contrast [[id number]] Collected:  03/05/20 0244     Updated:  03/05/20 0246    Narrative:       CT Pelvis WO    INDICATION:   Patient suffered injury while having close change at nursing home tonight. Pain in left leg    TECHNIQUE:   CT of the pelvis without IV contrast. Coronal and sagittal reconstructions were obtained.  Radiation dose reduction techniques included automated exposure control or exposure modulation based on body size. Count of known CT and cardiac nuc med studies  performed in previous 12 months: 0.     COMPARISON:   None available.    FINDINGS:  Patient could only lie on the left side this examination the patient's hips are flexed. No fracture or dislocation is visible. Visualized bowel is normal. Bladder is normal.      Impression:       Negative CT of the pelvis. No fracture or hip dislocation is visible.          Signer Name: Arturo Reyes MD   Signed: 3/5/2020 2:44 AM   Workstation Name: [facility]    Radiology [facility]            Assessment:   1. Left midshaft Femur Fracture          Plan:   Per Dr. Dumont recommend the patient undergo left femur open reduction internal fixation.  Patient has been cleared from medical standpoint.  Patient has been n.p.o.  Consent has been obtained by nursing staff from . Patient is leyva of the [facility].  Hospitalist states patient is stable for surgical intervention today.  Patient is receiving Rocephin IV every 24 hours for UTI.  Per Dr. Dumont this is sufficient for surgical prophylaxis.    Kris Mancuso, APRN March 7, 2020 12:46 PM

## 2020-03-07 NOTE — NURSING NOTE
Patient is leyva of [facility] attempted to contact guardian, Randi Chavis at [phone].  Offices closed today, recording gave number of [phone].   Spoke with Priya Santiago. She was authorized to give consent.

## 2020-03-07 NOTE — ANESTHESIA PROCEDURE NOTES
Airway  Urgency: elective    Date/Time: 3/7/2020 2:24 PM  End Time:3/7/2020 2:24 PM  Airway not difficult    General Information and Staff    Patient location during procedure: OR  Anesthesiologist: Sammy German MD    Indications and Patient Condition  Indications for airway management: airway protection    Preoxygenated: yes  MILS maintained throughout      Final Airway Details  Final airway type: endotracheal airway      Successful airway: ETT  Cuffed: yes   Successful intubation technique: direct laryngoscopy  Endotracheal tube insertion site: oral  Blade: Good  Blade size: 2  ETT size (mm): 7.0  Cormack-Lehane Classification: grade I - full view of glottis  Placement verified by: chest auscultation   Measured from: lips  ETT/EBT  to lips (cm): 21  Number of attempts at approach: 1  Assessment: lips, teeth, and gum same as pre-op

## 2020-03-07 NOTE — PROGRESS NOTES
Psychiatric HOSPITALIST PROGRESS NOTE     Patient Identification:  Name:  Bere Caal  Age:  64 y.o.  Sex:  female  :  1955  MRN:  [id number]  Visit Number:  [id number]  ROOM: [facility] OR/NONE     Primary Care Provider:  Kris Garcia MD    Length of stay:  2    Subjective        Chief Compliant Follow up for sepsis and fracture    Patient seen and examined this afternoon with no family at bedside.  Awake, more alert and speaking but the speech is incomprehensible so review of system is very limited.  Tachycardia much improved.  Had low grade temp last night. Plan to have surgery today. On RA.       Objective     Current Hospital Meds:    [START ON 3/8/2020] cefTRIAXone 2 g Intravenous Q24H   famotidine 20 mg Intravenous Q12H   ferric gluconate (FERRLECIT) IVPB 125 mg Intravenous Once   [MAR Hold] folic acid 1 mg Oral Daily   [MAR Hold] hyoscyamine 125 mcg Sublingual Nightly   [MAR Hold] lactobacillus acidophilus 1 capsule Oral Daily   [MAR Hold] lactulose 30 g Rectal TID   levETIRAcetam 250 mg Intravenous Q12H   metoprolol tartrate 25 mg Oral Q12H   [MAR Hold] metroNIDAZOLE 500 mg Intravenous Q8H   [MAR Hold] PARoxetine 10 mg Oral QAM   potassium chloride 10 mEq Intravenous Q1H   primidone 250 mg Oral TID   [MAR Hold] sodium chloride 10 mL Intravenous Q12H       dextrose 5 % and sodium chloride 0.9 % 50 mL/hr Last Rate: 50 mL/hr (20 0955)   lactated ringers 125 mL/hr      ----------------------------------------------------------------------------------------------------------------------  Vital Signs:  Temp:  [97.1 °F (36.2 °C)-100.2 °F (37.9 °C)] 98.5 °F (36.9 °C)  Heart Rate:  [] 118  Resp:  [18-22] 18  BP: ()/(63-82) 119/82  SpO2:  [90 %-99 %] 99 %  on  Flow (L/min):  [2] 2;   Device (Oxygen Therapy): nasal cannula  Body mass index is 25.94 kg/m².    Wt Readings from Last 3 Encounters:   20 68.5 kg (151 lb 1.6 oz)       Intake/Output Summary (Last 24 hours) at  3/7/2020 1544  Last data filed at 3/7/2020 1538  Gross per 24 hour   Intake 1200 ml   Output 300 ml   Net 900 ml     NPO Diet  ----------------------------------------------------------------------------------------------------------------------  Physical exam:  General: Comfortable, Not in distress.  Well-developed and well-nourished.   HENT:  Head:  Normocephalic and atraumatic.  Mouth: dry mucous membranes.    Eyes:  Conjunctivae and EOM are normal.  Pupils are equal, round, and reactive to light.  No scleral icterus.    Neck:  Neck supple.  No JVD present.    Cardiovascular:  tachycardia, irregularly irregular rhythm with no murmur.  Pulmonary/Chest:  No respiratory distress, no wheezes, no crackles, with normal breath sounds and good air movement.  Abdomen:  Soft.  Bowel sounds are normal.  No distension and no tenderness. Previous G Tube site.   Musculoskeletal:  Contractures noted in the the B/L upper and the lower extremities . no tenderness.  No red or swollen joints anywhere.    Neurological:  Awake, eyes open but non verbal.   Skin:  Skin is warm and dry. No rash noted. No pallor.   Peripheral vascular: pulses in all 4 extremities with no clubbing, no cyanosis, no edema.  Genitourinary: hutton +  ----------------------------------------------------------------------------------------------------------------------  ----------------------------------------------------------------------------------------------------------------------  Results from last 7 days   Lab Units 03/07/20  1044 03/07/20  0953 03/06/20  0413 03/05/20  0926 03/05/20  0429   CRP mg/dL 14.61*  --  12.77*  --  7.46*   LACTATE mmol/L  --   --   --   --  1.2   WBC 10*3/mm3  --  4.33 4.80  --  4.32   HEMOGLOBIN g/dL  --  9.3* 8.9* 10.4* 9.7*   HEMATOCRIT %  --  30.6* 29.5* 33.0* 30.9*   MCV fL  --  88.4 89.9  --  84.4   MCHC g/dL  --  30.4* 30.2*  --  31.4*   PLATELETS 10*3/mm3  --  129* 133*  --  153   INR   --   --   --  1.12*  --           Results from last 7 days   Lab Units 03/07/20  1044 03/06/20  0413 03/05/20  0429   SODIUM mmol/L 138 135* 137   POTASSIUM mmol/L 3.8 3.8 4.1   MAGNESIUM mg/dL  --  2.0 1.6   CHLORIDE mmol/L 103 103 100   CO2 mmol/L 21.4* 23.4 25.0   BUN mg/dL 8 8 13   CREATININE mg/dL 0.41* 0.39* 0.43*   PHOSPHORUS mg/dL  --   --  2.8   EGFR IF NONAFRICN AM mL/min/1.73 >150 >150 149   CALCIUM mg/dL 8.4* 8.0* 8.7   GLUCOSE mg/dL 105* 131* 115*   ALBUMIN g/dL  --  2.53* 2.80*   BILIRUBIN mg/dL  --  0.9 0.6   ALK PHOS U/L  --  194* 248*   AST (SGOT) U/L  --  101* 180*   ALT (SGPT) U/L  --  33 47*   Estimated Creatinine Clearance: 135.2 mL/min (A) (by C-G formula based on SCr of 0.41 mg/dL (L)).  Results from last 7 days   Lab Units 03/05/20  0429   CK TOTAL U/L 166   TROPONIN T ng/mL <0.010     Glucose   Date/Time Value Ref Range Status   03/07/2020 1116 98 70 - 130 mg/dL Final   03/07/2020 0700 102 70 - 130 mg/dL Final   03/07/2020 0538 114 70 - 130 mg/dL Final   03/06/2020 2053 120 70 - 130 mg/dL Final   03/06/2020 1638 103 70 - 130 mg/dL Final   03/06/2020 1251 101 70 - 130 mg/dL Final   03/06/2020 0943 96 70 - 130 mg/dL Final   03/06/2020 0442 140 (H) 70 - 130 mg/dL Final     Ammonia   Date Value Ref Range Status   03/07/2020 59 (H) 11 - 51 umol/L Final   03/06/2020 105 (H) 11 - 51 umol/L Final   03/05/2020 75 (H) 11 - 51 umol/L Final       Results from last 7 days   Lab Units 03/05/20  0452   NITRITE UA  Positive*   WBC UA /HPF 31-50*   BACTERIA UA /HPF 4+*   SQUAM EPITHEL UA /HPF 7-12*   URINECX  >100,000 CFU/mL Escherichia coli*  >100,000 CFU/mL Mixed Connie Isolated     Blood Culture   Date Value Ref Range Status   03/05/2020 No growth at 24 hours  Preliminary   03/05/2020 No growth at 24 hours  Preliminary   No results found for: RESPCX  Urine Culture   Date Value Ref Range Status   03/05/2020 >100,000 CFU/mL Escherichia coli (A)  Preliminary   03/05/2020 >100,000 CFU/mL Mixed Connie Isolated  Preliminary   No results  found for: WOUNDCXNo results found for: BODYFLDCXNo results found for: STOOLCX  I have personally looked at the labs and they are summarized above.  ----------------------------------------------------------------------------------------------------------------------  Imaging Results (Last 24 Hours)     Procedure Component Value Units Date/Time    FL Surgery Fluoro [[id number]] Resulted:  03/07/20 1402     Updated:  03/07/20 1533        I have personally reviewed the radiology images and read the final radiology report.    Assessment & Plan      Assessment and Plan:     Displaced oblique distal left femur fracture, with mild hemorrhage in surrounding muscle: NPO, orthopedic surgery on board. Plan for surgery tomorrow. Neurovascular checks every 4 hours. PRN Pain medication. Hold eliquis.      Sepsis with UTI (, temp 100.1, CRP 7.46): Continue with iv rocephin 2gm IV daily and iv flagyl and lactobacillus. Urine cx with >100 K E.coli. prelim blood culture are negative. xray chest reviewed.  Currently patient is afebrile and VSS.  No leukocytosis and CRP trending up.  Repeat labs in the am.      Non cirrhotic Hyperammonemia. improving. Continue with rectal  Lactulose and will decrease the dose. Liver enzymes improving. US liver normal liver. hepatitis B surface antigen +. Hepatitis core IgM negative. hepatitis surface antibody is negative and F/U hepatitis E antigen. Elevated Transaminases and alkaline phosphatase Improving. Hold tylenol.      Normocytic anemia: HGB IS 9.3. Severe Iron deficiency. Elevated ferritin.Will do a dose of iv iron tomorrow.      Atrial Fibrillation, chronicity unknown, with RVR: HR controlled. On lopressor. She is on elqiuis for stroke prevention, last dose per the nursing home staff was 3/4/2020 at 2000. Hold coreg and eliquis.      Hemiplegia and hemiparesis, hx of nontraumatic intracerebral hemorrhage, non verbal and bed ridden: turn q2hr, wound care for any skin breakdown. Supportive  care. CT head with chronic changes.      Essential hypertension: Hold home coreg since BP borderline. On lopressor.       Dysphasia: Continue NPO per SLP. utilizes modified diet at the nursing home, puree with nectar thick liquids, have SLP evaluate after surgery. On D5W and continue with accuchecks. Post op SLP evaluation to be done.      Hx of Seizures: continue keppra iv, and primidone seizure precautions. No recent seizure episodes per the nursing staff.      Activity: bedrest, turn q2hr   Tubes/Lines/Drains: hutton cath  Diet: NPO, gentle IV fluids for hydration, accu checks   DVT prophylaxis: SCD to right leg  GI prophylaxis: iv pepcid      Patient is high risk for the following reasons: UTI, Femur Fracture, IV pain medication       Gabriella Dela Cruz MD  03/07/20  3:44 PM

## 2020-03-07 NOTE — ANESTHESIA PREPROCEDURE EVALUATION
Anesthesia Evaluation     no history of anesthetic complications:  NPO Solid Status: > 8 hours  NPO Liquid Status: > 8 hours           Airway   Mallampati: II  TM distance: <3 FB  Neck ROM: full  No difficulty expected  Dental    (+) poor dentition    Pulmonary - normal exam   Cardiovascular - normal exam    (+) hypertension, dysrhythmias Atrial Fib,       Neuro/Psych  (+) seizures well controlled,     GI/Hepatic/Renal/Endo      Musculoskeletal     Abdominal  - normal exam   Substance History      OB/GYN          Other        ROS/Med Hx Other: Dysphagia Hemiplegia  Bed bound  Aphasic Traumatic Intracerebral bleed                Anesthesia Plan    ASA 3     general     intravenous induction     Anesthetic plan, all risks, benefits, and alternatives have been provided, discussed and informed consent has been obtained with: patient.

## 2020-03-08 ENCOUNTER — APPOINTMENT (OUTPATIENT)
Dept: GENERAL RADIOLOGY | Facility: HOSPITAL | Age: 65
End: 2020-03-08

## 2020-03-08 LAB
AMMONIA BLD-SCNC: 53 UMOL/L (ref 11–51)
ANION GAP SERPL CALCULATED.3IONS-SCNC: 11.8 MMOL/L (ref 5–15)
BASOPHILS # BLD AUTO: 0.02 10*3/MM3 (ref 0–0.2)
BASOPHILS NFR BLD AUTO: 0.4 % (ref 0–1.5)
BH CV ECHO MEAS - % IVS THICK: 21.5 %
BH CV ECHO MEAS - % LVPW THICK: 5.9 %
BH CV ECHO MEAS - ACS: 2.2 CM
BH CV ECHO MEAS - AO MAX PG: 3.9 MMHG
BH CV ECHO MEAS - AO MEAN PG: 3 MMHG
BH CV ECHO MEAS - AO ROOT AREA (BSA CORRECTED): 1.6
BH CV ECHO MEAS - AO ROOT AREA: 6.4 CM^2
BH CV ECHO MEAS - AO ROOT DIAM: 2.9 CM
BH CV ECHO MEAS - AO V2 MAX: 99 CM/SEC
BH CV ECHO MEAS - AO V2 MEAN: 77.9 CM/SEC
BH CV ECHO MEAS - AO V2 VTI: 21.1 CM
BH CV ECHO MEAS - BSA(HAYCOCK): 1.8 M^2
BH CV ECHO MEAS - BSA: 1.7 M^2
BH CV ECHO MEAS - BZI_BMI: 25.9 KILOGRAMS/M^2
BH CV ECHO MEAS - BZI_METRIC_HEIGHT: 162.6 CM
BH CV ECHO MEAS - BZI_METRIC_WEIGHT: 68.5 KG
BH CV ECHO MEAS - EDV(CUBED): 97.5 ML
BH CV ECHO MEAS - EDV(MOD-SP4): 32.2 ML
BH CV ECHO MEAS - EDV(TEICH): 97.5 ML
BH CV ECHO MEAS - EF(CUBED): 66.5 %
BH CV ECHO MEAS - EF(MOD-SP4): 50.9 %
BH CV ECHO MEAS - EF(TEICH): 58.1 %
BH CV ECHO MEAS - ESV(CUBED): 32.7 ML
BH CV ECHO MEAS - ESV(MOD-SP4): 15.8 ML
BH CV ECHO MEAS - ESV(TEICH): 40.9 ML
BH CV ECHO MEAS - FS: 30.6 %
BH CV ECHO MEAS - IVS/LVPW: 1.1
BH CV ECHO MEAS - IVSD: 1.4 CM
BH CV ECHO MEAS - IVSS: 1.7 CM
BH CV ECHO MEAS - LA DIMENSION: 3.6 CM
BH CV ECHO MEAS - LA/AO: 1.3
BH CV ECHO MEAS - LV DIASTOLIC VOL/BSA (35-75): 18.6 ML/M^2
BH CV ECHO MEAS - LV MASS(C)D: 245.3 GRAMS
BH CV ECHO MEAS - LV MASS(C)DI: 141.3 GRAMS/M^2
BH CV ECHO MEAS - LV MASS(C)S: 180.6 GRAMS
BH CV ECHO MEAS - LV MASS(C)SI: 104.1 GRAMS/M^2
BH CV ECHO MEAS - LV SYSTOLIC VOL/BSA (12-30): 9.1 ML/M^2
BH CV ECHO MEAS - LVIDD: 4.6 CM
BH CV ECHO MEAS - LVIDS: 3.2 CM
BH CV ECHO MEAS - LVLD AP4: 5.8 CM
BH CV ECHO MEAS - LVLS AP4: 5.1 CM
BH CV ECHO MEAS - LVOT AREA (M): 2.8 CM^2
BH CV ECHO MEAS - LVOT AREA: 2.8 CM^2
BH CV ECHO MEAS - LVOT DIAM: 1.9 CM
BH CV ECHO MEAS - LVPWD: 1.3 CM
BH CV ECHO MEAS - LVPWS: 1.4 CM
BH CV ECHO MEAS - MV E MAX VEL: 108 CM/SEC
BH CV ECHO MEAS - PA ACC TIME: 0.08 SEC
BH CV ECHO MEAS - PA PR(ACCEL): 42.6 MMHG
BH CV ECHO MEAS - RAP SYSTOLE: 10 MMHG
BH CV ECHO MEAS - RVSP: 37.5 MMHG
BH CV ECHO MEAS - SI(AO): 77.5 ML/M^2
BH CV ECHO MEAS - SI(CUBED): 37.4 ML/M^2
BH CV ECHO MEAS - SI(MOD-SP4): 9.4 ML/M^2
BH CV ECHO MEAS - SI(TEICH): 32.6 ML/M^2
BH CV ECHO MEAS - SV(AO): 134.6 ML
BH CV ECHO MEAS - SV(CUBED): 64.9 ML
BH CV ECHO MEAS - SV(MOD-SP4): 16.4 ML
BH CV ECHO MEAS - SV(TEICH): 56.6 ML
BH CV ECHO MEAS - TR MAX VEL: 262 CM/SEC
BUN BLD-MCNC: 7 MG/DL (ref 8–23)
BUN/CREAT SERPL: 19.4 (ref 7–25)
CALCIUM SPEC-SCNC: 7.8 MG/DL (ref 8.6–10.5)
CHLORIDE SERPL-SCNC: 105 MMOL/L (ref 98–107)
CO2 SERPL-SCNC: 19.2 MMOL/L (ref 22–29)
CREAT BLD-MCNC: 0.36 MG/DL (ref 0.57–1)
CRP SERPL-MCNC: 12.55 MG/DL (ref 0–0.5)
DEPRECATED RDW RBC AUTO: 56.8 FL (ref 37–54)
EOSINOPHIL # BLD AUTO: 0.05 10*3/MM3 (ref 0–0.4)
EOSINOPHIL NFR BLD AUTO: 1 % (ref 0.3–6.2)
ERYTHROCYTE [DISTWIDTH] IN BLOOD BY AUTOMATED COUNT: 17.8 % (ref 12.3–15.4)
GFR SERPL CREATININE-BSD FRML MDRD: >150 ML/MIN/1.73
GLUCOSE BLD-MCNC: 112 MG/DL (ref 65–99)
GLUCOSE BLDC GLUCOMTR-MCNC: 101 MG/DL (ref 70–130)
GLUCOSE BLDC GLUCOMTR-MCNC: 105 MG/DL (ref 70–130)
GLUCOSE BLDC GLUCOMTR-MCNC: 118 MG/DL (ref 70–130)
GLUCOSE BLDC GLUCOMTR-MCNC: 96 MG/DL (ref 70–130)
HBV E AG SERPL QL IA: NEGATIVE
HCT VFR BLD AUTO: 25.4 % (ref 34–46.6)
HCT VFR BLD AUTO: 26.1 % (ref 34–46.6)
HGB BLD-MCNC: 7.9 G/DL (ref 12–15.9)
HGB BLD-MCNC: 8.1 G/DL (ref 12–15.9)
IMM GRANULOCYTES # BLD AUTO: 0.02 10*3/MM3 (ref 0–0.05)
IMM GRANULOCYTES NFR BLD AUTO: 0.4 % (ref 0–0.5)
LV EF 2D ECHO EST: 70 %
LYMPHOCYTES # BLD AUTO: 0.54 10*3/MM3 (ref 0.7–3.1)
LYMPHOCYTES NFR BLD AUTO: 11.3 % (ref 19.6–45.3)
MAGNESIUM SERPL-MCNC: 1.7 MG/DL (ref 1.6–2.4)
MCH RBC QN AUTO: 27.1 PG (ref 26.6–33)
MCHC RBC AUTO-ENTMCNC: 31.1 G/DL (ref 31.5–35.7)
MCV RBC AUTO: 87.3 FL (ref 79–97)
MONOCYTES # BLD AUTO: 0.48 10*3/MM3 (ref 0.1–0.9)
MONOCYTES NFR BLD AUTO: 10.1 % (ref 5–12)
NEUTROPHILS # BLD AUTO: 3.66 10*3/MM3 (ref 1.7–7)
NEUTROPHILS NFR BLD AUTO: 76.8 % (ref 42.7–76)
NRBC BLD AUTO-RTO: 0 /100 WBC (ref 0–0.2)
NT-PROBNP SERPL-MCNC: 1195 PG/ML (ref 5–900)
PLATELET # BLD AUTO: 133 10*3/MM3 (ref 140–450)
PMV BLD AUTO: 10.2 FL (ref 6–12)
POTASSIUM BLD-SCNC: 4.2 MMOL/L (ref 3.5–5.2)
RBC # BLD AUTO: 2.91 10*6/MM3 (ref 3.77–5.28)
SODIUM BLD-SCNC: 136 MMOL/L (ref 136–145)
WBC NRBC COR # BLD: 4.77 10*3/MM3 (ref 3.4–10.8)

## 2020-03-08 PROCEDURE — 93010 ELECTROCARDIOGRAM REPORT: CPT | Performed by: INTERNAL MEDICINE

## 2020-03-08 PROCEDURE — 83880 ASSAY OF NATRIURETIC PEPTIDE: CPT | Performed by: HOSPITALIST

## 2020-03-08 PROCEDURE — 83735 ASSAY OF MAGNESIUM: CPT | Performed by: HOSPITALIST

## 2020-03-08 PROCEDURE — 25010000002 NA FERRIC GLUC CPLX PER 12.5 MG: Performed by: HOSPITALIST

## 2020-03-08 PROCEDURE — 82140 ASSAY OF AMMONIA: CPT | Performed by: ORTHOPAEDIC SURGERY

## 2020-03-08 PROCEDURE — 93005 ELECTROCARDIOGRAM TRACING: CPT | Performed by: HOSPITALIST

## 2020-03-08 PROCEDURE — 85025 COMPLETE CBC W/AUTO DIFF WBC: CPT | Performed by: ORTHOPAEDIC SURGERY

## 2020-03-08 PROCEDURE — 94799 UNLISTED PULMONARY SVC/PX: CPT

## 2020-03-08 PROCEDURE — 85018 HEMOGLOBIN: CPT | Performed by: HOSPITALIST

## 2020-03-08 PROCEDURE — 71045 X-RAY EXAM CHEST 1 VIEW: CPT

## 2020-03-08 PROCEDURE — 86140 C-REACTIVE PROTEIN: CPT | Performed by: ORTHOPAEDIC SURGERY

## 2020-03-08 PROCEDURE — 25010000002 CEFTRIAXONE: Performed by: HOSPITALIST

## 2020-03-08 PROCEDURE — 80048 BASIC METABOLIC PNL TOTAL CA: CPT | Performed by: ORTHOPAEDIC SURGERY

## 2020-03-08 PROCEDURE — 25010000002 LEVETRIRACETAM PER 10 MG: Performed by: ORTHOPAEDIC SURGERY

## 2020-03-08 PROCEDURE — 85014 HEMATOCRIT: CPT | Performed by: HOSPITALIST

## 2020-03-08 PROCEDURE — 82962 GLUCOSE BLOOD TEST: CPT

## 2020-03-08 PROCEDURE — 99232 SBSQ HOSP IP/OBS MODERATE 35: CPT | Performed by: HOSPITALIST

## 2020-03-08 PROCEDURE — 25010000002 MORPHINE PER 10 MG: Performed by: ORTHOPAEDIC SURGERY

## 2020-03-08 PROCEDURE — 25010000002 DIGOXIN PER 500 MCG: Performed by: HOSPITALIST

## 2020-03-08 RX ORDER — FAMOTIDINE 20 MG/1
20 TABLET, FILM COATED ORAL
Status: DISCONTINUED | OUTPATIENT
Start: 2020-03-08 | End: 2020-03-11 | Stop reason: HOSPADM

## 2020-03-08 RX ORDER — IPRATROPIUM BROMIDE AND ALBUTEROL SULFATE 2.5; .5 MG/3ML; MG/3ML
3 SOLUTION RESPIRATORY (INHALATION) EVERY 6 HOURS PRN
Status: DISCONTINUED | OUTPATIENT
Start: 2020-03-08 | End: 2020-03-11 | Stop reason: HOSPADM

## 2020-03-08 RX ORDER — LACTULOSE 10 G/15ML
20 SOLUTION ORAL 2 TIMES DAILY
Status: DISCONTINUED | OUTPATIENT
Start: 2020-03-08 | End: 2020-03-11 | Stop reason: HOSPADM

## 2020-03-08 RX ORDER — LACTULOSE 10 G/15ML
30 SOLUTION ORAL 2 TIMES DAILY
Status: DISCONTINUED | OUTPATIENT
Start: 2020-03-08 | End: 2020-03-08

## 2020-03-08 RX ORDER — IRON POLYSACCHARIDE COMPLEX 150 MG
150 CAPSULE ORAL DAILY
Status: DISCONTINUED | OUTPATIENT
Start: 2020-03-13 | End: 2020-03-11 | Stop reason: HOSPADM

## 2020-03-08 RX ORDER — DEXTROSE MONOHYDRATE 50 MG/ML
50 INJECTION, SOLUTION INTRAVENOUS CONTINUOUS
Status: DISCONTINUED | OUTPATIENT
Start: 2020-03-08 | End: 2020-03-08

## 2020-03-08 RX ORDER — DIGOXIN 0.25 MG/ML
125 INJECTION INTRAMUSCULAR; INTRAVENOUS
Status: DISCONTINUED | OUTPATIENT
Start: 2020-03-08 | End: 2020-03-11 | Stop reason: HOSPADM

## 2020-03-08 RX ORDER — LEVETIRACETAM 250 MG/1
250 TABLET ORAL EVERY 12 HOURS SCHEDULED
Status: DISCONTINUED | OUTPATIENT
Start: 2020-03-08 | End: 2020-03-11 | Stop reason: HOSPADM

## 2020-03-08 RX ADMIN — METRONIDAZOLE 500 MG: 500 INJECTION, SOLUTION INTRAVENOUS at 15:16

## 2020-03-08 RX ADMIN — LACTULOSE 30 G: 10 SOLUTION ORAL at 09:01

## 2020-03-08 RX ADMIN — METRONIDAZOLE 500 MG: 500 INJECTION, SOLUTION INTRAVENOUS at 22:00

## 2020-03-08 RX ADMIN — DIGOXIN 125 MCG: 0.25 INJECTION INTRAMUSCULAR; INTRAVENOUS at 13:25

## 2020-03-08 RX ADMIN — SODIUM CHLORIDE 125 MG: 9 INJECTION, SOLUTION INTRAVENOUS at 09:51

## 2020-03-08 RX ADMIN — DEXTROSE MONOHYDRATE 50 ML/HR: 50 INJECTION, SOLUTION INTRAVENOUS at 09:52

## 2020-03-08 RX ADMIN — FAMOTIDINE 20 MG: 10 INJECTION INTRAVENOUS at 09:02

## 2020-03-08 RX ADMIN — SODIUM CHLORIDE 250 MG: 9 INJECTION, SOLUTION INTRAVENOUS at 09:01

## 2020-03-08 RX ADMIN — MORPHINE SULFATE 1 MG: 2 INJECTION, SOLUTION INTRAMUSCULAR; INTRAVENOUS at 13:27

## 2020-03-08 RX ADMIN — MORPHINE SULFATE 1 MG: 2 INJECTION, SOLUTION INTRAMUSCULAR; INTRAVENOUS at 01:04

## 2020-03-08 RX ADMIN — SODIUM CHLORIDE, PRESERVATIVE FREE 10 ML: 5 INJECTION INTRAVENOUS at 09:02

## 2020-03-08 RX ADMIN — CEFTRIAXONE 2 G: 2 INJECTION, POWDER, FOR SOLUTION INTRAMUSCULAR; INTRAVENOUS at 12:14

## 2020-03-08 RX ADMIN — METRONIDAZOLE 500 MG: 500 INJECTION, SOLUTION INTRAVENOUS at 06:08

## 2020-03-08 NOTE — PROGRESS NOTES
[facility] HOSPITALIST PROGRESS NOTE     Patient Identification:  Name:  Bere Caal  Age:  64 y.o.  Sex:  female  :  1955  MRN:  [id number]  Visit Number:  [id number]  ROOM: [address] [place] [address]     Primary Care Provider:  Kris Garcia MD    Length of stay:  3    Subjective        Chief Compliant Follow up for sepsis and fracture    Patient seen and examined this afternoon with no family at bedside.  Awake, more alert and speaking but the speech is incomprehensible so review of system is very limited. Having on and off Tachycardia.  Afebrile and no events overnight. On RA. NGT could not be placed yesterday after 4 trial. Placed this evening.      Objective     Current Hospital Meds:    apixaban 5 mg Oral Q12H   cefTRIAXone 2 g Intravenous Q24H   digoxin 125 mcg Intravenous Daily   famotidine 20 mg Oral BID AC   [START ON 3/9/2020] ferric gluconate (FERRLECIT) IVPB 125 mg Intravenous Daily   folic acid 1 mg Oral Daily   hyoscyamine 125 mcg Sublingual Nightly   [START ON 3/13/2020] iron polysaccharides 150 mg Oral Daily   lactobacillus acidophilus 1 capsule Oral Daily   lactulose 20 g Oral BID   levETIRAcetam 250 mg Oral Q12H   metoprolol tartrate 25 mg Oral Q12H   metroNIDAZOLE 500 mg Intravenous Q8H   PARoxetine 10 mg Oral QAM   primidone 250 mg Oral TID   sodium chloride 10 mL Intravenous Q12H        ----------------------------------------------------------------------------------------------------------------------  Vital Signs:  Temp:  [97.7 °F (36.5 °C)-99.6 °F (37.6 °C)] 99.6 °F (37.6 °C)  Heart Rate:  [102-129] 107  Resp:  [17-20] 18  BP: (102-128)/(54-80) 128/76  SpO2:  [94 %-99 %] 99 %  on  Flow (L/min):  [3] 3;   Device (Oxygen Therapy): nasal cannula  Body mass index is 25.94 kg/m².    Wt Readings from Last 3 Encounters:   20 68.5 kg (151 lb 1.6 oz)       Intake/Output Summary (Last 24 hours) at 3/8/2020 1844  Last data filed at 3/8/2020 1718  Gross per 24 hour   Intake 0 ml      Output 550 ml   Net -550 ml     NPO Diet  ----------------------------------------------------------------------------------------------------------------------  Physical exam:  General: Comfortable, Not in distress.  Well-developed and well-nourished.   HENT:  Head:  Normocephalic and atraumatic.  Mouth: dry mucous membranes.    Eyes:  Conjunctivae and EOM are normal.  Pupils are equal, round, and reactive to light.  No scleral icterus.    Neck:  Neck supple.  No JVD present.    Cardiovascular:  tachycardia, irregularly irregular rhythm with no murmur.  Pulmonary/Chest:  No respiratory distress, no wheezes, no crackles, with normal breath sounds and good air movement.  Abdomen:  Soft.  Bowel sounds are normal.  No distension and no tenderness. Previous G Tube site.   Musculoskeletal:  Contractures noted in the the B/L upper and the lower extremities . no tenderness.  No red or swollen joints anywhere.    Neurological:  Awake, eyes open, speaking but the speech is incomprehensible.   Skin:  Skin is warm and dry. No rash noted. No pallor.   Peripheral vascular: pulses in all 4 extremities with no clubbing, no cyanosis, no edema.  Genitourinary: hutton +  ----------------------------------------------------------------------------------------------------------------------  ----------------------------------------------------------------------------------------------------------------------  Results from last 7 days   Lab Units 03/08/20  1340 03/08/20  0120 03/07/20  1044 03/07/20  0953 03/06/20  0413 03/05/20  0926 03/05/20  0429   CRP mg/dL  --  12.55* 14.61*  --  12.77*  --  7.46*   LACTATE mmol/L  --   --   --   --   --   --  1.2   WBC 10*3/mm3  --  4.77  --  4.33 4.80  --  4.32   HEMOGLOBIN g/dL 8.1* 7.9*  --  9.3* 8.9* 10.4* 9.7*   HEMATOCRIT % 26.1* 25.4*  --  30.6* 29.5* 33.0* 30.9*   MCV fL  --  87.3  --  88.4 89.9  --  84.4   MCHC g/dL  --  31.1*  --  30.4* 30.2*  --  31.4*   PLATELETS 10*3/mm3  --  133*  --   129* 133*  --  153   INR   --   --   --   --   --  1.12*  --      Results from last 7 days   Lab Units 03/08/20  0120 03/07/20  1044 03/06/20  0413 03/05/20  0429   SODIUM mmol/L 136 138 135* 137   POTASSIUM mmol/L 4.2 3.8 3.8 4.1   MAGNESIUM mg/dL 1.7  --  2.0 1.6   CHLORIDE mmol/L 105 103 103 100   CO2 mmol/L 19.2* 21.4* 23.4 25.0   BUN mg/dL 7* 8 8 13   CREATININE mg/dL 0.36* 0.41* 0.39* 0.43*   PHOSPHORUS mg/dL  --   --   --  2.8   EGFR IF NONAFRICN AM mL/min/1.73 >150 >150 >150 149   CALCIUM mg/dL 7.8* 8.4* 8.0* 8.7   GLUCOSE mg/dL 112* 105* 131* 115*   ALBUMIN g/dL  --  2.56* 2.53* 2.80*   BILIRUBIN mg/dL  --  1.0 0.9 0.6   ALK PHOS U/L  --  175* 194* 248*   AST (SGOT) U/L  --  64* 101* 180*   ALT (SGPT) U/L  --  27 33 47*   Estimated Creatinine Clearance: 154 mL/min (A) (by C-G formula based on SCr of 0.36 mg/dL (L)).  Results from last 7 days   Lab Units 03/05/20  0429   CK TOTAL U/L 166   TROPONIN T ng/mL <0.010     Glucose   Date/Time Value Ref Range Status   03/08/2020 1705 96 70 - 130 mg/dL Final   03/08/2020 1145 101 70 - 130 mg/dL Final   03/08/2020 0709 105 70 - 130 mg/dL Final   03/08/2020 0005 118 70 - 130 mg/dL Final   03/07/2020 2129 116 70 - 130 mg/dL Final   03/07/2020 1705 115 70 - 130 mg/dL Final   03/07/2020 1116 98 70 - 130 mg/dL Final   03/07/2020 0700 102 70 - 130 mg/dL Final     Ammonia   Date Value Ref Range Status   03/08/2020 53 (H) 11 - 51 umol/L Final   03/07/2020 59 (H) 11 - 51 umol/L Final   03/06/2020 105 (H) 11 - 51 umol/L Final       Results from last 7 days   Lab Units 03/05/20  0452   NITRITE UA  Positive*   WBC UA /HPF 31-50*   BACTERIA UA /HPF 4+*   SQUAM EPITHEL UA /HPF 7-12*   URINECX  >100,000 CFU/mL Escherichia coli*  >100,000 CFU/mL Mixed Connie Isolated     Blood Culture   Date Value Ref Range Status   03/05/2020 No growth at 24 hours  Preliminary   03/05/2020 No growth at 24 hours  Preliminary   No results found for: RESPCX  Urine Culture   Date Value Ref Range  Status   03/05/2020 >100,000 CFU/mL Escherichia coli (A)  Preliminary   03/05/2020 >100,000 CFU/mL Mixed Connie Isolated  Preliminary   No results found for: WOUNDCXNo results found for: BODYFLDCXNo results found for: STOOLCX  I have personally looked at the labs and they are summarized above.  ----------------------------------------------------------------------------------------------------------------------  Imaging Results (Last 24 Hours)     ** No results found for the last 24 hours. **        I have personally reviewed the radiology images and read the final radiology report.    Assessment & Plan      Assessment and Plan:     Displaced oblique distal left femur fracture, with mild hemorrhage in surrounding muscle: orthopedic surgery on board. S/P ORIF 03/7. Neurovascular checks every 4 hours. PRN Pain medication. resume eliquis.      Sepsis with UTI (, temp 100.1, CRP 7.46): Continue with iv rocephin 2gm IV daily and iv flagyl and lactobacillus. Urine cx with >100 K E.coli. prelim blood culture are negative. xray chest reviewed.  Currently patient is afebrile and VSS.  No leukocytosis and CRP trending down.  Repeat labs in the am.      Non cirrhotic Hyperammonemia. improving. Continue with po  Lactulose and will decrease the dose to 20 bid. Liver enzymes improving. US liver normal liver. hepatitis B surface antigen +. Hepatitis core IgM negative. hepatitis surface antibody is negative and hepatitis E antigen negative. Elevated Transaminases and alkaline phosphatase Improving. Hold tylenol. Likely chronic Hep B. Will check Anti HBe and Total Anti HBc and Hep B DNA.     Normocytic ID anemia: HGB IS 8.1 post op, monitor. Severe Iron deficiency. Elevated ferritin.Will do a dose of iv iron for 5 doses and than po niferex.      Atrial Fibrillation, chronicity unknown, with RVR: HR controlled. On lopressor. resume elqiuis for stroke prevention. Hold coreg.     Hemiplegia and hemiparesis, hx of nontraumatic  intracerebral hemorrhage, non verbal and bed ridden: turn q2hr, wound care for any skin breakdown. Supportive care. CT head with chronic changes.      Essential hypertension: Hold home coreg since BP borderline. On lopressor.       Dysphasia: S/P NGT placement this evening. Will start on TF. DC IVF. SLP to follow in am. utilizes modified diet at the nursing home, puree with nectar thick liquids, have SLP evaluate after surgery. continue with accuchecks.      Hx of Seizures: continue keppra po and primidone. seizure precautions. No recent seizure episodes per the nursing staff.      Activity: bedrest, turn q2hr   Tubes/Lines/Drains: hutton cath, to be dced in am  Diet: TF, accu checks   DVT prophylaxis: eliquis  GI prophylaxis: po pepcid     Management and the plans discussed in detail with the Nursing.      Patient is high risk for the following reasons: UTI, Femur Fracture, IV pain medication       Gabriella Dela Cruz MD  03/08/20  6:44 PM

## 2020-03-08 NOTE — PLAN OF CARE
Patient had ORIF left femur.  Attempted to place dobhoff for tube feeding. Attempted by three nurses, unable to advance past 45 cm. Attempted reg NG placement, unable to advance. Dr Dela Cruz aware.

## 2020-03-08 NOTE — PLAN OF CARE
Problem: Patient Care Overview  Goal: Plan of Care Review  Outcome: Ongoing (interventions implemented as appropriate)   NG tube placed today

## 2020-03-08 NOTE — PLAN OF CARE
Problem: Patient Care Overview  Goal: Plan of Care Review  Outcome: Ongoing (interventions implemented as appropriate)  Flowsheets (Taken 3/8/2020 7418)  Progress: no change  Plan of Care Reviewed With: patient  Note:   Labs improving. No complaints this shift. Will continue to monitor

## 2020-03-08 NOTE — PROGRESS NOTES
Inpatient Progress Note  Bere Caal  Date: 03/08/20  MRN: [id number]      Subjective:   Patient is doing well status post left femur open reduction internal fixation with retrograde nail.  Patient objectively is in no acute distress.  Patient is a leyva of the [facility].  Patient is chronically anticoagulated with Eliquis.  This has not been restarted by hospitalist service.  Patient done well with no immediate postop complications noted.        Objective:    Vitals:    03/07/20 2328 03/08/20 0340 03/08/20 0620 03/08/20 1054   BP: 110/61 118/60 102/67 102/54   BP Location: Left arm Left arm Left arm Left arm   Patient Position: Lying Lying Lying Lying   Pulse: 111 117 108 102   Resp: 17 18 18 18   Temp: 97.7 °F (36.5 °C) 97.8 °F (36.6 °C) 98.5 °F (36.9 °C) 98.2 °F (36.8 °C)   TempSrc: Axillary Axillary Axillary Axillary   SpO2: 94% 99% 99% 99%   Weight:       Height:              Physical Exam:  Constitutional:  Well-developed and well-nourished.  No respiratory distress.        Musculoskeletal: Splint is in place to the left lower extremity.  Dressings are clean dry and intact left lower extremity.  No cyanosis left lower extremity.  No active drainage noted left lower extremity.  Neurovascularly intact left lower extremity.  Cap refill less than 3 seconds to the left foot distal digits.      Labs:    Results from last 7 days   Lab Units 03/08/20  0120 03/07/20  1044 03/07/20  0953 03/06/20  0413 03/05/20  0926 03/05/20  0429   CRP mg/dL 12.55* 14.61*  --  12.77*  --  7.46*   LACTATE mmol/L  --   --   --   --   --  1.2   WBC 10*3/mm3 4.77  --  4.33 4.80  --  4.32   HEMOGLOBIN g/dL 7.9*  --  9.3* 8.9* 10.4* 9.7*   HEMATOCRIT % 25.4*  --  30.6* 29.5* 33.0* 30.9*   MCV fL 87.3  --  88.4 89.9  --  84.4   MCHC g/dL 31.1*  --  30.4* 30.2*  --  31.4*   PLATELETS 10*3/mm3 133*  --  129* 133*  --  153   INR   --   --   --   --  1.12*  --          Results from last 7 days   Lab Units 03/08/20  0120 03/07/20  1044 03/06/20  0413  03/05/20  0429   SODIUM mmol/L 136 138 135* 137   POTASSIUM mmol/L 4.2 3.8 3.8 4.1   MAGNESIUM mg/dL 1.7  --  2.0 1.6   CHLORIDE mmol/L 105 103 103 100   CO2 mmol/L 19.2* 21.4* 23.4 25.0   BUN mg/dL 7* 8 8 13   CREATININE mg/dL 0.36* 0.41* 0.39* 0.43*   EGFR IF NONAFRICN AM mL/min/1.73 >150 >150 >150 149   CALCIUM mg/dL 7.8* 8.4* 8.0* 8.7   GLUCOSE mg/dL 112* 105* 131* 115*   ALBUMIN g/dL  --  2.56* 2.53* 2.80*   BILIRUBIN mg/dL  --  1.0 0.9 0.6   ALK PHOS U/L  --  175* 194* 248*   AST (SGOT) U/L  --  64* 101* 180*   ALT (SGPT) U/L  --  27 33 47*   Estimated Creatinine Clearance: 154 mL/min (A) (by C-G formula based on SCr of 0.36 mg/dL (L)).  Ammonia   Date Value Ref Range Status   03/08/2020 53 (H) 11 - 51 umol/L Final   03/07/2020 59 (H) 11 - 51 umol/L Final   03/06/2020 105 (H) 11 - 51 umol/L Final     Results from last 7 days   Lab Units 03/05/20  0429   CK TOTAL U/L 166   TROPONIN T ng/mL <0.010     Results from last 7 days   Lab Units 03/08/20  0120 03/06/20  0413   PROBNP pg/mL 1,195.0* 1,002.0*     No results found for: HGBA1C  Lab Results   Component Value Date    TSH 2.150 03/05/2020    FREET4 1.56 03/05/2020         Pain Management Panel     There is no flowsheet data to display.          Blood Culture   Date Value Ref Range Status   03/05/2020 No growth at 3 days  Preliminary   03/05/2020 No growth at 3 days  Preliminary     Urine Culture   Date Value Ref Range Status   03/05/2020 >100,000 CFU/mL Escherichia coli (A)  Final   03/05/2020 >100,000 CFU/mL Mixed Connie Isolated  Final     No results found for: WOUNDCX  No results found for: STOOLCX              Radiology:  Imaging Results (Last 72 Hours)     Procedure Component Value Units Date/Time    FL Surgery Fluoro [[id number]] Collected:  03/07/20 1549     Updated:  03/07/20 1551    Narrative:       EXAMINATION: FL SURGERY FLUORO-      CLINICAL INDICATION:     ORIF LEFT FEMUR ; S72.22XA-Displaced  subtrochanteric fracture of left femur, initial  encounter for closed  fracture; N30.00-Acute cystitis without hematuria     TECHNIQUE: Frontal view of the region of interest..  FLUOROSCOPY TIME: 3 minutes     COMPARISON: NONE      FINDINGS: Fluoroscopy during surgery.       Impression:       As above     This report was finalized on 3/7/2020 3:49 PM by Dr. Mario Brasher MD.       XR Femur 2 View Left [[id number]] Collected:  03/06/20 0834     Updated:  03/06/20 0836    Narrative:       EXAMINATION: XR FEMUR 2 VW LEFT-      TECHNIQUE: XR FEMUR 2 VW LEFT-        INDICATION: displaced distal femur fracture; S72.22XA-Displaced  subtrochanteric fracture of left femur, initial encounter for closed  fracture; N30.00-Acute cystitis without hematuria      COMPARISON: NONE     FINDINGS:          BONES: MODERATELY DISPLACED AND ANGULATED FRACTURE OF THE  MID-DISTAL FEMUR SHAFT.          JOINT: No dislocation.          SOFT TISSUES:  No soft tissue mass.       Impression:       MODERATELY DISPLACED AND ANGULATED FRACTURE OF THE  MID-DISTAL FEMUR SHAFT.     COMMUNICATION: Per this written report.     This report was finalized on 3/6/2020 8:34 AM by Dr. Mario Brasher MD.       XR Chest 1 View [[id number]] Collected:  03/06/20 0833     Updated:  03/06/20 0836    Narrative:       EXAMINATION: XR CHEST 1 VW-      CLINICAL INDICATION:     Sepsis; S72.22XA-Displaced subtrochanteric  fracture of left femur, initial encounter for closed fracture;  N30.00-Acute cystitis without hematuria     TECHNIQUE:  XR CHEST 1 VW-      COMPARISON: NONE      FINDINGS:   Coarsened interstitial markings. Heart and mediastinum contours are  unremarkable.  No pleural effusion.  No pneumothorax.     Impression:       Proximal humerus hardware noted. IMPRESSION: No radiographic evidence of  acute cardiac or pulmonary disease.        This report was finalized on 3/6/2020 8:34 AM by Dr. Mario Brasher MD.       US Liver [[id number]] Collected:  03/06/20 0829     Updated:  03/06/20 0832    Narrative:        EXAMINATION: US LIVER-         CLINICAL INDICATION:     hyperammonemia; S72.22XA-Displaced  subtrochanteric fracture of left femur, initial encounter for closed  fracture; N30.00-Acute cystitis without hematuria     TECHNIQUE: Multiplanar gray scale ultrasound of the abdomen.     COMPARISON: NONE      FINDINGS:   Visualized pancreas is unremarkable.      The CBD measures 3.48 mm .  The liver demonstrates normal echogenicity without focal lesion.    No ascites demonstrated.   There is no sonographic Quintanilla sign.       Impression:       No sonographic findings to explain abdominal pain.      This report was finalized on 3/6/2020 8:30 AM by Dr. Mario Brasher MD.               Assessment:   1.Status post left femur open reduction internal fixation with retrograde nail.          Plan:   Patient is chronically anticoagulated with Eliquis.  Hospitalist has not started this back yet postoperatively.  Orthopedic service will defer when to initiate this postoperatively to hospitalist service  When they feel it is appropriate.  Patient is doing well in the postoperative period.  No immediate postop complications noted.  Orthopedic service will continue to follow.        Kris Mancuso, APRN March 8, 2020 1:07 PM

## 2020-03-09 ENCOUNTER — APPOINTMENT (OUTPATIENT)
Dept: GENERAL RADIOLOGY | Facility: HOSPITAL | Age: 65
End: 2020-03-09

## 2020-03-09 LAB
ANION GAP SERPL CALCULATED.3IONS-SCNC: 10.5 MMOL/L (ref 5–15)
BASOPHILS # BLD AUTO: 0.03 10*3/MM3 (ref 0–0.2)
BASOPHILS NFR BLD AUTO: 0.7 % (ref 0–1.5)
BUN BLD-MCNC: 6 MG/DL (ref 8–23)
BUN/CREAT SERPL: 16.7 (ref 7–25)
CALCIUM SPEC-SCNC: 8 MG/DL (ref 8.6–10.5)
CHLORIDE SERPL-SCNC: 103 MMOL/L (ref 98–107)
CO2 SERPL-SCNC: 21.5 MMOL/L (ref 22–29)
CREAT BLD-MCNC: 0.36 MG/DL (ref 0.57–1)
CRP SERPL-MCNC: 13.18 MG/DL (ref 0–0.5)
DEPRECATED RDW RBC AUTO: 56.4 FL (ref 37–54)
EOSINOPHIL # BLD AUTO: 0.14 10*3/MM3 (ref 0–0.4)
EOSINOPHIL NFR BLD AUTO: 3.2 % (ref 0.3–6.2)
ERYTHROCYTE [DISTWIDTH] IN BLOOD BY AUTOMATED COUNT: 18 % (ref 12.3–15.4)
GFR SERPL CREATININE-BSD FRML MDRD: >150 ML/MIN/1.73
GLUCOSE BLD-MCNC: 106 MG/DL (ref 65–99)
GLUCOSE BLDC GLUCOMTR-MCNC: 121 MG/DL (ref 70–130)
GLUCOSE BLDC GLUCOMTR-MCNC: 129 MG/DL (ref 70–130)
GLUCOSE BLDC GLUCOMTR-MCNC: 142 MG/DL (ref 70–130)
GLUCOSE BLDC GLUCOMTR-MCNC: 154 MG/DL (ref 70–130)
GLUCOSE BLDC GLUCOMTR-MCNC: 97 MG/DL (ref 70–130)
HCT VFR BLD AUTO: 25 % (ref 34–46.6)
HGB BLD-MCNC: 8 G/DL (ref 12–15.9)
IMM GRANULOCYTES # BLD AUTO: 0.02 10*3/MM3 (ref 0–0.05)
IMM GRANULOCYTES NFR BLD AUTO: 0.5 % (ref 0–0.5)
LEVETIRACETAM SERPL-MCNC: <1 UG/ML (ref 10–40)
LYMPHOCYTES # BLD AUTO: 0.62 10*3/MM3 (ref 0.7–3.1)
LYMPHOCYTES NFR BLD AUTO: 14.2 % (ref 19.6–45.3)
MCH RBC QN AUTO: 27.4 PG (ref 26.6–33)
MCHC RBC AUTO-ENTMCNC: 32 G/DL (ref 31.5–35.7)
MCV RBC AUTO: 85.6 FL (ref 79–97)
MONOCYTES # BLD AUTO: 0.51 10*3/MM3 (ref 0.1–0.9)
MONOCYTES NFR BLD AUTO: 11.7 % (ref 5–12)
NEUTROPHILS # BLD AUTO: 3.04 10*3/MM3 (ref 1.7–7)
NEUTROPHILS NFR BLD AUTO: 69.7 % (ref 42.7–76)
NRBC BLD AUTO-RTO: 0 /100 WBC (ref 0–0.2)
PLATELET # BLD AUTO: 144 10*3/MM3 (ref 140–450)
PMV BLD AUTO: 10.3 FL (ref 6–12)
POTASSIUM BLD-SCNC: 3.3 MMOL/L (ref 3.5–5.2)
RBC # BLD AUTO: 2.92 10*6/MM3 (ref 3.77–5.28)
SODIUM BLD-SCNC: 135 MMOL/L (ref 136–145)
WBC NRBC COR # BLD: 4.36 10*3/MM3 (ref 3.4–10.8)

## 2020-03-09 PROCEDURE — 82962 GLUCOSE BLOOD TEST: CPT

## 2020-03-09 PROCEDURE — 74230 X-RAY XM SWLNG FUNCJ C+: CPT | Performed by: RADIOLOGY

## 2020-03-09 PROCEDURE — 80048 BASIC METABOLIC PNL TOTAL CA: CPT | Performed by: HOSPITALIST

## 2020-03-09 PROCEDURE — 74230 X-RAY XM SWLNG FUNCJ C+: CPT

## 2020-03-09 PROCEDURE — 25010000002 DIGOXIN PER 500 MCG: Performed by: HOSPITALIST

## 2020-03-09 PROCEDURE — 87517 HEPATITIS B DNA QUANT: CPT | Performed by: HOSPITALIST

## 2020-03-09 PROCEDURE — 99232 SBSQ HOSP IP/OBS MODERATE 35: CPT | Performed by: INTERNAL MEDICINE

## 2020-03-09 PROCEDURE — 86707 HEPATITIS BE ANTIBODY: CPT | Performed by: HOSPITALIST

## 2020-03-09 PROCEDURE — 86140 C-REACTIVE PROTEIN: CPT | Performed by: HOSPITALIST

## 2020-03-09 PROCEDURE — 25010000002 MORPHINE PER 10 MG: Performed by: ORTHOPAEDIC SURGERY

## 2020-03-09 PROCEDURE — 85025 COMPLETE CBC W/AUTO DIFF WBC: CPT | Performed by: HOSPITALIST

## 2020-03-09 PROCEDURE — 92611 MOTION FLUOROSCOPY/SWALLOW: CPT | Performed by: SPEECH-LANGUAGE PATHOLOGIST

## 2020-03-09 PROCEDURE — 86704 HEP B CORE ANTIBODY TOTAL: CPT | Performed by: HOSPITALIST

## 2020-03-09 PROCEDURE — 25010000002 CEFTRIAXONE: Performed by: HOSPITALIST

## 2020-03-09 PROCEDURE — 25010000002 NA FERRIC GLUC CPLX PER 12.5 MG: Performed by: HOSPITALIST

## 2020-03-09 PROCEDURE — 94799 UNLISTED PULMONARY SVC/PX: CPT

## 2020-03-09 RX ORDER — MAGNESIUM SULFATE HEPTAHYDRATE 40 MG/ML
4 INJECTION, SOLUTION INTRAVENOUS AS NEEDED
Status: DISCONTINUED | OUTPATIENT
Start: 2020-03-09 | End: 2020-03-11 | Stop reason: HOSPADM

## 2020-03-09 RX ORDER — MAGNESIUM SULFATE HEPTAHYDRATE 40 MG/ML
2 INJECTION, SOLUTION INTRAVENOUS AS NEEDED
Status: DISCONTINUED | OUTPATIENT
Start: 2020-03-09 | End: 2020-03-11 | Stop reason: HOSPADM

## 2020-03-09 RX ORDER — CEFDINIR 300 MG/1
300 CAPSULE ORAL EVERY 12 HOURS SCHEDULED
Status: DISCONTINUED | OUTPATIENT
Start: 2020-03-09 | End: 2020-03-11 | Stop reason: HOSPADM

## 2020-03-09 RX ORDER — METRONIDAZOLE 250 MG/1
500 TABLET ORAL EVERY 8 HOURS SCHEDULED
Status: DISCONTINUED | OUTPATIENT
Start: 2020-03-09 | End: 2020-03-11 | Stop reason: HOSPADM

## 2020-03-09 RX ORDER — POTASSIUM CHLORIDE 7.45 MG/ML
10 INJECTION INTRAVENOUS
Status: DISCONTINUED | OUTPATIENT
Start: 2020-03-09 | End: 2020-03-11 | Stop reason: HOSPADM

## 2020-03-09 RX ORDER — POTASSIUM CHLORIDE 20 MEQ/1
40 TABLET, EXTENDED RELEASE ORAL AS NEEDED
Status: DISCONTINUED | OUTPATIENT
Start: 2020-03-09 | End: 2020-03-11 | Stop reason: HOSPADM

## 2020-03-09 RX ORDER — POTASSIUM CHLORIDE 1.5 G/1.77G
40 POWDER, FOR SOLUTION ORAL AS NEEDED
Status: DISCONTINUED | OUTPATIENT
Start: 2020-03-09 | End: 2020-03-11 | Stop reason: HOSPADM

## 2020-03-09 RX ADMIN — METRONIDAZOLE 500 MG: 250 TABLET ORAL at 23:38

## 2020-03-09 RX ADMIN — MORPHINE SULFATE 1 MG: 2 INJECTION, SOLUTION INTRAMUSCULAR; INTRAVENOUS at 04:28

## 2020-03-09 RX ADMIN — METRONIDAZOLE 500 MG: 500 INJECTION, SOLUTION INTRAVENOUS at 05:58

## 2020-03-09 RX ADMIN — PRIMIDONE 250 MG: 250 TABLET ORAL at 21:43

## 2020-03-09 RX ADMIN — PRIMIDONE 250 MG: 250 TABLET ORAL at 08:02

## 2020-03-09 RX ADMIN — SODIUM CHLORIDE 125 MG: 9 INJECTION, SOLUTION INTRAVENOUS at 09:14

## 2020-03-09 RX ADMIN — HYOSCYAMINE SULFATE 125 MCG: 0.12 TABLET, ORALLY DISINTEGRATING ORAL at 21:43

## 2020-03-09 RX ADMIN — APIXABAN 5 MG: 5 TABLET, FILM COATED ORAL at 04:03

## 2020-03-09 RX ADMIN — DIGOXIN 125 MCG: 0.25 INJECTION INTRAMUSCULAR; INTRAVENOUS at 11:21

## 2020-03-09 RX ADMIN — FAMOTIDINE 20 MG: 20 TABLET ORAL at 06:00

## 2020-03-09 RX ADMIN — SODIUM CHLORIDE, PRESERVATIVE FREE 10 ML: 5 INJECTION INTRAVENOUS at 21:44

## 2020-03-09 RX ADMIN — HYOSCYAMINE SULFATE 125 MCG: 0.12 TABLET, ORALLY DISINTEGRATING ORAL at 03:41

## 2020-03-09 RX ADMIN — FAMOTIDINE 20 MG: 20 TABLET ORAL at 16:57

## 2020-03-09 RX ADMIN — METRONIDAZOLE 500 MG: 500 INJECTION, SOLUTION INTRAVENOUS at 13:00

## 2020-03-09 RX ADMIN — FOLIC ACID 1 MG: 1 TABLET ORAL at 08:02

## 2020-03-09 RX ADMIN — Medication 1 CAPSULE: at 08:02

## 2020-03-09 RX ADMIN — LEVETIRACETAM 250 MG: 250 TABLET, FILM COATED ORAL at 16:57

## 2020-03-09 RX ADMIN — LACTULOSE 20 G: 10 SOLUTION ORAL at 21:44

## 2020-03-09 RX ADMIN — PAROXETINE HYDROCHLORIDE 10 MG: 20 TABLET, FILM COATED ORAL at 06:00

## 2020-03-09 RX ADMIN — LACTULOSE 20 G: 10 SOLUTION ORAL at 03:41

## 2020-03-09 RX ADMIN — METOPROLOL TARTRATE 25 MG: 25 TABLET, FILM COATED ORAL at 08:02

## 2020-03-09 RX ADMIN — CEFTRIAXONE 2 G: 2 INJECTION, POWDER, FOR SOLUTION INTRAMUSCULAR; INTRAVENOUS at 11:21

## 2020-03-09 RX ADMIN — APIXABAN 5 MG: 5 TABLET, FILM COATED ORAL at 16:57

## 2020-03-09 RX ADMIN — LACTULOSE 20 G: 10 SOLUTION ORAL at 08:02

## 2020-03-09 RX ADMIN — LEVETIRACETAM 250 MG: 250 TABLET, FILM COATED ORAL at 04:03

## 2020-03-09 RX ADMIN — PRIMIDONE 250 MG: 250 TABLET ORAL at 16:57

## 2020-03-09 RX ADMIN — SODIUM CHLORIDE, PRESERVATIVE FREE 10 ML: 5 INJECTION INTRAVENOUS at 08:04

## 2020-03-09 RX ADMIN — CEFDINIR 300 MG: 300 CAPSULE ORAL at 23:30

## 2020-03-09 NOTE — PROGRESS NOTES
Discharge Planning Assessment   Antony     Patient Name: Bere Caal  MRN: [id number]  Today's Date: 3/9/2020    Admit Date: 3/5/2020    Discharge Plan     Row Name 03/09/20 1603       Plan    Plan Pt was admitted from [facility] and had a 14 day skilled bed hold upon admission. State Guardian, Татьяна Chavis needs to be notified at discharge. SS to follow.         Destination      Service Provider Request Status Selected Services Address Phone Number Fax Number    [facility] Pending - No Request Sent N/A [address] [place] [id number] [phone]     JOHANNY Mayes

## 2020-03-09 NOTE — PLAN OF CARE
MBS able to be completed this am. Deep laryngeal penetration w/ thin liquids via all presentation styles leading to silent aspiration. Severely delayed cough response elicited. Will initiate modified po diet of puree consistency w/ nectar thick liquids. SLP to f/u for diet safety/acceptance.  Problem: Patient Care Overview  Goal: Plan of Care Review  Outcome: Ongoing (interventions implemented as appropriate)

## 2020-03-09 NOTE — PLAN OF CARE
Problem: Fall Risk (Adult)  Goal: Identify Related Risk Factors and Signs and Symptoms  Outcome: Ongoing (interventions implemented as appropriate)  Flowsheets (Taken 3/7/2020 0645 by Filomena Joshua, RN)  Related Risk Factors (Fall Risk): environment unfamiliar;gait/mobility problems;confusion/agitation  Signs and Symptoms (Fall Risk): presence of risk factors     Problem: Skin Injury Risk (Adult)  Goal: Identify Related Risk Factors and Signs and Symptoms  Outcome: Ongoing (interventions implemented as appropriate)  Flowsheets (Taken 3/7/2020 0645 by Filomena Joshua, RN)  Related Risk Factors (Skin Injury Risk): edema;mobility impaired;cognitive impairment     Problem: Patient Care Overview  Goal: Plan of Care Review  Outcome: Ongoing (interventions implemented as appropriate)  Flowsheets  Taken 3/8/2020 0626 by Filomena Joshua RN  Progress: no change  Taken 3/8/2020 2100 by Merari Hughes RN  Plan of Care Reviewed With: patient     Problem: Fracture Orthopaedic (Adult)  Goal: Signs and Symptoms of Listed Potential Problems Will be Absent, Minimized or Managed (Fracture Orthopaedic)  Outcome: Ongoing (interventions implemented as appropriate)  Flowsheets (Taken 3/7/2020 0645 by Filomena Joshua, RN)  Problems Assessed (Orthopaedic Fracture): all  Problems Present (Orthopaedic Fracture): pain;situational response

## 2020-03-09 NOTE — MBS/VFSS/FEES
Acute Care - Speech Language Pathology   Swallow Initial Evaluation [facility]   MODIFIED BARIUM SWALLOW STUDY     Patient Name: Bere Caal  : 1955  MRN: [id number]  Today's Date: 3/9/2020      Admit Date: 3/5/2020    Bere Caal  presents to the radiology suite this am from 3335/1P to participate in an instrumental MBS to assess safety/efficacy of swallowing fnx, determine safest/least restrictive diet. Pt is s/p NG tube placement yesterday and has been tolerating TF w/o difficulty. Pt has been unable to transport to radiology suite in vess chair to participate since being admitted to [facility]. However, pt had surgery to repair femur fx on Saturday and has improved in overall cooperation per RN.         Social History     Socioeconomic History   • Marital status: Single     Spouse name: Not on file   • Number of children: Not on file   • Years of education: Not on file   • Highest education level: Not on file   Tobacco Use   • Smoking status: Never Smoker   • Smokeless tobacco: Never Used   Substance and Sexual Activity   • Alcohol use: Not Currently     Comment: unknown, current nursing home resident    • Drug use: Not Currently     Comment: unknown    • Sexual activity: Not Currently        Chest xray on 3/9/2020 revealed  CR Chest 1 Vw     INDICATION:   64-year-old female for NG tube placement      COMPARISON:    3/6/2020     FINDINGS:  Single portable AP view(s) of the chest.     There is an enteric tube present and the tip is at the level of the mid body stomach.     Cardiac silhouette is enlarged but stable. Lung volumes remain low. There is no new effusion or dense consolidation. Interstitial prominence in both lungs suspicious for mild volume overload. There is a nodular density in the peripheral upper lobe on the  left that probably represents a calcified granuloma and measures 10 mm. Status post bilateral humerus fracture repair. No pneumothorax.      IMPRESSION:  1. The tip of the enteric tube is at  the level of the mid body stomach.  2. Persisting cardiomegaly and interstitial prominence which suggests underlying mild volume overload.     Signer Name: Eduardo Valiente MD   Signed: 3/9/2020 3:02 AM   Workstation Name: [email or web address]    [facility]    Diet Orders (active) (From admission, onward)     Start     Ordered    03/09/20 1800  Dietary Nutrition Supplements Magic Cup  Daily With Breakfast, Lunch & Dinner      03/09/20 1534    03/09/20 1139  Diet Pureed; Nectar / Syrup Thick; Consistent Carbohydrate  Diet Effective Now     Comments:  Meds crushed in puree.  1:1 assist w/ all po intake    03/09/20 1139    03/07/20 1741  Isosource HN (Osmolite 1.2); Tube Feeding Type: Continuous; Continuous Tube Feeding Start Rate (mL/hr): 30; Then Advance Rate By (mL/hr): RD To Manage; Every __ Hours: Patient at Goal Rate; To Goal Rate of (mL/hr): RD to Manage; Water Flush Bolus ...  Diet Effective Now      03/07/20 1742                She is observed on O2 via NC 3L tolerating well w/o complications.     Risks and benefits of the procedure are explained w/ pt nodding head to indicate understanding/agreement to participate.     Pt is positioned upright and centered in a soft strap supportive vess chair to accept multiple po presentations of crumbled solid cracker, puree, honey thick, nectar thick, and thin liquids via spoon, cup and straw, along w/ whole placebo pill in puree. Pt is unable to self feed, this is  Consistent w/ her premorbid baseline.     All views are from the lateral plane.     Facial/oral structures are symmetrical upon observation. Lingual protrusion reveals no deviation. Oral mucosa are mildly xerostomic, pink, and clean. Secretions are clear, thin, and well controlled. OROM/STEVEN is delayed/weak to imitate limited oral postures 2/2 pt participation. Noted significant odor coming from oral cavity. Gag is not assessed. Volitional cough is CNA as pt does not elicit volitional cough. Voice  is weak in intensity, clear in quality w/ limited verbalizations. Pt is aphasic and this is consistent w/ her baseline.    Upon po presentations, adequate bolus anticipation w/ weak labial seal for bolus clearance via spoon bowl, cup rim stability and suction via straw.  Bolus formation, manipulation,  and control are generally weak w/ increased oral prep time w/ crumbled solid cracker and primarily phasic mastication pattern. A-p transit is delayed w/o oral residue. Tongue base retraction and linguavelar seal are impaired w/ premature spillage w/ all test consistencies intermittently. No laryngeal penetration or aspiration is evidenced before the swallow.     Pharyngeal swallow is delayed w/ weak hyolaryngeal elevation and epiglottic inversion. Pharyngeal contraction is impaired w/ mild diffuse pharyngeal residue. Deep laryngeal penetration w/ thin liquids via all presentation styles. Silent aspiration occurring during and after the swallow. Severely delayed cough response elicited at end of study. Repeat presentations of nectar thick liquids via straw revealed no laryngeal penetration or aspiration. No other laryngeal penetration or aspiration evidenced during or after the swallow.     Full esophageal sweep reveals no obvious mucosal abnormalities. Motility is reduced w/ retrograde flow noted intermittently across this evaluation.    Pt is unable to manipulate and swallow whole barium pill despite max cues and prompts.      **IMAGE** RETROGRADE FLOW AT END OF STUDY       Visit Dx:     ICD-10-CM ICD-9-CM   1. Closed displaced subtrochanteric fracture of left femur, initial encounter (CMS/[facility]) S72.22XA 820.22   2. 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)   • Closed displaced subtrochanteric fracture of left femur (CMS/[facility])     Past Medical History:   Diagnosis Date   • Aphasia    • Atrial fibrillation (CMS/[facility])    • Dysphagia       • Hemiplegia (CMS/HCC)    • Hypertension    • Seizures (CMS/HCC)    • Traumatic intracerebral hemorrhage (CMS/HCC)      Past Surgical History:   Procedure Laterality Date   • FEMUR IM NAILING RETROGRADE Left 3/7/2020    Procedure: FEMUR INTRAMEDULLARY NAILING RETROGRADE;  Surgeon: Modesto Dumont MD;  Location: [facility];  Service: Orthopedics;  Laterality: Left;         EDUCATION  The patient has been educated in the following areas:   Dysphagia (Swallowing Impairment) Oral Care/Hydration Modified Diet Instruction.    SLP Recommendation and Plan    Impression: Per this evaluation, Ms. Caal presents w/ a moderate-severe impaired oral phase, mildly impaired pharyngeal dysphagia. Increased oral prep time w/ primarily phasic mastication pattern. Deep laryngeal penetration w/ thin liquids via all presentation styles. Silent aspiration occurring during and after the swallow. Severely delayed cough response elicited at end of study. No other laryngeal penetration or aspiration evidenced.    She is felt to most benefit from initiation of modified po diet of puree consistency w/ nectar thick liquids. Meds crushed or given via non-oral method. Universal aspiration precautions. 1:1 assist w/ all po intake.    Recommendations:   1. Puree consistency w/ nectar thick liquids.  2. Meds crushed or given via non-oral method.   3. BROOK precautions.   4. Oral care protocol.   5. Universal aspiration precautions  6. 1:1 assist w/ all po intake  7. Upright and centered for all po intake  8. NO ice cream, jello, milkshakes, thin water or ice chips    SLP to f/u for diet safety/acceptance.    D/w pt results and recommendations w/ verbal understanding and agreement.     D/w RN Shayna results and recommendations w/ verbal understanding and agreement.     Thank you for allowing me to participate in the care of your patient-  Nicki Leiva M.S., NAVID-SLPTime Calculation:   Time Calculation- SLP     Time Calculation- SLP     Row  Name 03/09/20 1539    SLP - Next Appointment  03/10/20  -Recorded by: JOSÉ MIGUEL            User Key     Initials Name Provider Type    Nicki Jay, MS CCC-SLP Speech and Language Pathologist          Therapy Charges for Today     Code Description Service Date Service Provider Modifiers Qty    03692040052 HC ST MOTION FLUORO EVAL SWALLOW 8 3/9/2020 Nicki Leiva, MS CCC-SLP GN 1               Nicki Leiva MS CCC-SLP  3/9/2020

## 2020-03-09 NOTE — PROGRESS NOTES
[facility] HOSPITALIST PROGRESS NOTE     Patient Identification:  Name:  Bere Caal  Age:  64 y.o.  Sex:  female  :  1955  MRN:  [id number]  Visit Number:  [id number]  ROOM: [address] [place] [address]     Primary Care Provider:  Kris Garcia MD     Date of Admission: 3/5/2020    Length of stay in inpatient status:  4    Subjective     Chief Compliant:    Chief Complaint   Patient presents with   • Leg Pain       History of Presenting Illness:  Patient is a 65 yo female who was admitted on 3/5/2020 with leg pain; she was being transferred in the bed and the staff heard a snapping sound.  Imaging in the ED showed a left mid to distal femur shaft that was moderately displaced and angulated; she underwent repair with a retrograde femoral nail from [employer] on 3/7/2020.  When I saw the patient today, she would sometimes look at me when I would say her name but she did not answer any questions nor did she act like she cannot hear me.  As a result of her inability to talk at this time, I was unable to obtain any history from her.  No family members or words of state were at bedside today.    Objective     Current Hospital Meds:  apixaban 5 mg Oral Q12H   cefTRIAXone 2 g Intravenous Q24H   digoxin 125 mcg Intravenous Daily   famotidine 20 mg Oral BID AC   ferric gluconate (FERRLECIT) IVPB 125 mg Intravenous Daily   folic acid 1 mg Oral Daily   hyoscyamine 125 mcg Sublingual Nightly   [START ON 3/13/2020] iron polysaccharides 150 mg Oral Daily   lactobacillus acidophilus 1 capsule Oral Daily   lactulose 20 g Oral BID   levETIRAcetam 250 mg Oral Q12H   metoprolol tartrate 25 mg Oral Q12H   metroNIDAZOLE 500 mg Intravenous Q8H   PARoxetine 10 mg Oral QAM   primidone 250 mg Oral TID   sodium chloride 10 mL Intravenous Q12H        Current Antimicrobial Therapy:  Anti-Infectives (From admission, onward)    Ordered     Dose/Rate Route Frequency Start Stop    20 0929  cefTRIAXone (ROCEPHIN) 2 g/100 mL 0.9% NS  VTB (MIKHAIL)     Gabriella Dela Cruz MD reviewed the order on 03/08/20 1822.   Ordering Provider:  Gabriella Dela Cruz MD    2 g  over 30 Minutes Intravenous Every 24 Hours 03/08/20 1100 03/12/20 1059    03/07/20 1438  cefTRIAXone (ROCEPHIN) 2 g/100 mL 0.9% NS VTB (MIKHAIL)     Ordering Provider:  Modesto Dumont MD    2 g  over 30 Minutes Intravenous Once 03/07/20 1500 03/07/20 1456    03/05/20 1952  metroNIDAZOLE (FLAGYL) 500 mg/100mL IVPB     Gabriella Dela Cruz MD reviewed the order on 03/06/20 0937.   Ordering Provider:  Modesto Dumont MD    500 mg  over 60 Minutes Intravenous Every 8 Hours Scheduled 03/05/20 2200 03/10/20 2159    03/05/20 0544  cefTRIAXone (ROCEPHIN) 2 g/100 mL 0.9% NS VTB (MIKHAIL)     Ordering Provider:  Jc Peña MD    2 g  over 30 Minutes Intravenous Once 03/05/20 0546 03/05/20 0647        Current Diuretic Therapy:  Diuretics (From admission, onward)    None        ----------------------------------------------------------------------------------------------------------------------  Vital Signs:  Temp:  [98.2 °F (36.8 °C)-99.7 °F (37.6 °C)] 99.4 °F (37.4 °C)  Heart Rate:  [] 108  Resp:  [18-20] 20  BP: (102-128)/(54-76) 112/65  SpO2:  [95 %-99 %] 95 %  on  Flow (L/min):  [3] 3;   Device (Oxygen Therapy): room air  Body mass index is 25.94 kg/m².    Wt Readings from Last 3 Encounters:   03/06/20 68.5 kg (151 lb 1.6 oz)     Intake & Output (last 3 days)       03/06 0701 - 03/07 0700 03/07 0701 - 03/08 0700 03/08 0701 - 03/09 0700 03/09 0701 - 03/10 0700    P.O. 0 0 0     I.V. (mL/kg) 1200 (17.5)       IV Piggyback    100    Total Intake(mL/kg) 1200 (17.5) 0 (0) 0 (0) 100 (1.5)    Urine (mL/kg/hr) 500 (0.3) 400 (0.2) 550 (0.3) 150 (0.9)    Stool 0 0 0     Blood  50      Total Output 500 450 550 150    Net +700 -450 -550 -50            Stool Unmeasured Occurrence 7 x 2 x 1 x         NPO  Diet  ----------------------------------------------------------------------------------------------------------------------  Physical Exam:  Physical Exam   Constitutional: She appears well-developed. No distress.   HENT:   Head: Normocephalic and atraumatic.   Nose: Nose normal.   Eyes: Pupils are equal, round, and reactive to light. Right eye exhibits no discharge. Left eye exhibits no discharge.   Cardiovascular: An irregularly irregular rhythm present.   No murmur heard.  Pulmonary/Chest: No respiratory distress. She has no wheezes. She has no rales.   Neurological: She is alert.   Skin: Capillary refill takes less than 2 seconds.     ----------------------------------------------------------------------------------------------------------------------  Tele: MERRY husain with heart rates 80s to 115.  I personally reviewed the telemetry strips.  ----------------------------------------------------------------------------------------------------------------------  LABS:        CBC and coagulation:  Results from last 7 days   Lab Units 03/09/20  0142 03/08/20  1340 03/08/20  0120 03/07/20  1044 03/07/20  0953 03/06/20  0413 03/05/20  0926 03/05/20  0429   LACTATE mmol/L  --   --   --   --   --   --   --  1.2   SED RATE mm/hr  --   --   --   --   --   --   --  62*   CRP mg/dL 13.18*  --  12.55* 14.61*  --  12.77*  --  7.46*   WBC 10*3/mm3 4.36  --  4.77  --  4.33 4.80  --  4.32   HEMOGLOBIN g/dL 8.0* 8.1* 7.9*  --  9.3* 8.9* 10.4* 9.7*   HEMATOCRIT % 25.0* 26.1* 25.4*  --  30.6* 29.5* 33.0* 30.9*   MCV fL 85.6  --  87.3  --  88.4 89.9  --  84.4   MCHC g/dL 32.0  --  31.1*  --  30.4* 30.2*  --  31.4*   PLATELETS 10*3/mm3 144  --  133*  --  129* 133*  --  153   INR   --   --   --   --   --   --  1.12*  --      Renal and electrolytes:  Results from last 7 days   Lab Units 03/09/20  0142 03/08/20  0120 03/07/20  1044 03/06/20  0413 03/05/20  0429   SODIUM mmol/L 135* 136 138 135* 137   POTASSIUM mmol/L 3.3* 4.2 3.8 3.8 4.1    MAGNESIUM mg/dL  --  1.7  --  2.0 1.6   CHLORIDE mmol/L 103 105 103 103 100   CO2 mmol/L 21.5* 19.2* 21.4* 23.4 25.0   BUN mg/dL 6* 7* 8 8 13   CREATININE mg/dL 0.36* 0.36* 0.41* 0.39* 0.43*   EGFR IF NONAFRICN AM mL/min/1.73 >150 >150 >150 >150 149   CALCIUM mg/dL 8.0* 7.8* 8.4* 8.0* 8.7   PHOSPHORUS mg/dL  --   --   --   --  2.8   GLUCOSE mg/dL 106* 112* 105* 131* 115*     Estimated Creatinine Clearance: 154 mL/min (A) (by C-G formula based on SCr of 0.36 mg/dL (L)).    Liver and pancreatic function:  Results from last 7 days   Lab Units 03/08/20  0120 03/07/20  1044 03/07/20  0953 03/06/20  0413  03/05/20  0429   ALBUMIN g/dL  --  2.56*  --  2.53*  --  2.80*   BILIRUBIN mg/dL  --  1.0  --  0.9  --  0.6   ALK PHOS U/L  --  175*  --  194*  --  248*   AST (SGOT) U/L  --  64*  --  101*  --  180*   ALT (SGPT) U/L  --  27  --  33  --  47*   AMMONIA umol/L 53*  --  59* 105*   < >  --    LIPASE U/L  --   --   --   --   --  35    < > = values in this interval not displayed.     Endocrine function:  Point of care bedside glucose levels:  Results from last 7 days   Lab Units 03/09/20  0554 03/09/20  0125 03/08/20  1705 03/08/20  1145 03/08/20  0709 03/08/20  0005 03/07/20  2129 03/07/20  1705 03/07/20  1116 03/07/20  0700 03/07/20  0538 03/06/20  2053 03/06/20  1638 03/06/20  1251   GLUCOSE mg/dL 121 97 96 101 105 118 116 115 98 102 114 120 103 101     Glucose levels from the CMP:  Results from last 7 days   Lab Units 03/09/20  0142 03/08/20  0120 03/07/20  1044 03/06/20  0413 03/05/20  0429   GLUCOSE mg/dL 106* 112* 105* 131* 115*     Lab Results   Component Value Date    TSH 2.150 03/05/2020    FREET4 1.56 03/05/2020     Cardiac:  Results from last 7 days   Lab Units 03/08/20  0120 03/06/20  0413 03/05/20  0429   CK TOTAL U/L  --   --  166   TROPONIN T ng/mL  --   --  <0.010   PROBNP pg/mL 1,195.0* 1,002.0*  --        Cultures:  Brief Urine Lab Results  (Last result in the past 365 days)      Color   Clarity   Blood    Leuk Est   Nitrite   Protein   CREAT   Urine HCG        03/05/20 0452 Dark Yellow Turbid Large (3+) Moderate (2+) Positive 30 mg/dL (1+)             Lab Results   Component Value Date    URINECX >100,000 CFU/mL Escherichia coli (A) 03/05/2020    URINECX >100,000 CFU/mL Mixed Connie Isolated 03/05/2020     Lab Results   Component Value Date    BLOODCX No growth at 4 days 03/05/2020    BLOODCX No growth at 4 days 03/05/2020       I have personally looked at the labs and they are summarized above.  ----------------------------------------------------------------------------------------------------------------------  Detailed radiology reports for the last 24 hours:    Imaging Results (Last 24 Hours)     Procedure Component Value Units Date/Time    XR Chest 1 View [[id number]] Collected:  03/09/20 0302     Updated:  03/09/20 0304    Narrative:       CR Chest 1 Vw    INDICATION:   64-year-old female for NG tube placement     COMPARISON:    3/6/2020    FINDINGS:  Single portable AP view(s) of the chest.    There is an enteric tube present and the tip is at the level of the mid body stomach.    Cardiac silhouette is enlarged but stable. Lung volumes remain low. There is no new effusion or dense consolidation. Interstitial prominence in both lungs suspicious for mild volume overload. There is a nodular density in the peripheral upper lobe on the  left that probably represents a calcified granuloma and measures 10 mm. Status post bilateral humerus fracture repair. No pneumothorax.       Impression:       1. The tip of the enteric tube is at the level of the mid body stomach.  2. Persisting cardiomegaly and interstitial prominence which suggests underlying mild volume overload.    Signer Name: Eduardo Valiente MD   Signed: 3/9/2020 3:02 AM   Workstation Name: [email or web address]    [facility]        I have personally looked at the radiology images and I have read the available final report.    Assessment & Plan        -Displaced oblique distal left femur fracture, with mild hemorrhage in surrounding muscle  -Sepsis that was present on admission (, temp 100.1, CRP 7.46) due to an E. coli UTI urinary tract infection  -Hyperammonemia and elevated Transaminases and alkaline phosphatase  -Mental handicap  -Atrial fibrillation with RVR  -Hemiplegia and hemiparesis, hx of nontraumatic intracerebral hemorrhage  -Dysphagia  -History of seizures  -Acute hypokalemia and acute hypomagnesemia  -Prolonged QTc of 489 ms  -Normocytic anemia, some of which is due to acute blood loss anemia (no transfusions yet this admission)  -Mild mitral valve regurgitation and mild tricuspid regurgitation  -Left atrial cavity size is moderate-to-severely dilated    When I saw the patient today, she was on her way for her speech evaluation.  The patient passed and is now allowed to have nectar thick.  We will monitor the patient's intake closely and try to switch her medications over to oral formulation.  We will replace her potassium and magnesium per replacement protocol.  I will change the Rocephin to Omnicef.  Hopefully she can go back to the nursing home soon.  We will avoid QT prolonging agents at this time.    VTE Prophylaxis:   Mechanical Order History:     None      Pharmalogical Order History:     Ordered     Dose Route Frequency Stop    03/08/20 1821  apixaban (ELIQUIS) tablet 5 mg      5 mg PO Every 12 Hours Scheduled --    03/07/20 0855  heparin (porcine) 5000 UNIT/ML injection 5,000 Units  Status:  Discontinued      5,000 Units SC Every 12 Hours Scheduled 03/07/20 0956    Pending  apixaban (ELIQUIS) tablet 5 mg      5 mg PO Every 12 Hours Scheduled --        True Arevalo MD  [facility]  03/09/20  9:35 AM

## 2020-03-09 NOTE — PLAN OF CARE
Problem: Patient Care Overview  Goal: Plan of Care Review  Outcome: Ongoing (interventions implemented as appropriate)  Flowsheets (Taken 3/9/2020 1525)  Progress: improving  Outcome Summary: Patient doing well today, hutton catheter removed, voiding well.  Pt passed swallowing eval, diet ordered, ng tube removed.

## 2020-03-09 NOTE — CONSULTS
Inpatient Progress Note    Bere Caal  Date: 03/09/20  MRN: [id number]      Subjective:   Patient is a 64 year old female who is status post left femur intermedullary fixation. She is POD #2. Patient is doing well post-operatively. Patient is a le[facility]va of the [facility], no family or caregiver at bedside, patient is nonverbal. Patient is a resident at [facility].     Objective:    Vitals:    03/09/20 0300 03/09/20 0638 03/09/20 0704 03/09/20 1121   BP: 108/58  112/65    BP Location: Left arm  Left arm    Patient Position: Lying  Lying    Pulse: 116  108 110   Resp: 20  20    Temp: 99.5 °F (37.5 °C)  99.4 °F (37.4 °C)    TempSrc: Axillary  Axillary    SpO2: 98% 95%     Weight:       Height:              Physical Exam:  Constitutional:  Well-developed and well-nourished.  No respiratory distress.      Musculoskeletal: On evaluation of left femur, dressing CD&I. Staples intact. Splint in place LLE. No active drainage noted. Cap refill <3 seconds.   Neurological:  Alert and oriented.  Skin:  Skin is warm and dry.   Psychiatric:  Normal mood and affect.    Labs:    Results from last 7 days   Lab Units 03/09/20  0142 03/08/20  1340 03/08/20  0120 03/07/20  1044 03/07/20  0953  03/05/20  0926 03/05/20  0429   CRP mg/dL 13.18*  --  12.55* 14.61*  --    < >  --  7.46*   LACTATE mmol/L  --   --   --   --   --   --   --  1.2   WBC 10*3/mm3 4.36  --  4.77  --  4.33   < >  --  4.32   HEMOGLOBIN g/dL 8.0* 8.1* 7.9*  --  9.3*   < > 10.4* 9.7*   HEMATOCRIT % 25.0* 26.1* 25.4*  --  30.6*   < > 33.0* 30.9*   MCV fL 85.6  --  87.3  --  88.4   < >  --  84.4   MCHC g/dL 32.0  --  31.1*  --  30.4*   < >  --  31.4*   PLATELETS 10*3/mm3 144  --  133*  --  129*   < >  --  153   INR   --   --   --   --   --   --  1.12*  --     < > = values in this interval not displayed.         Results from last 7 days   Lab Units 03/09/20  0142 03/08/20  0120 03/07/20  1044 03/06/20  0413 03/05/20  0429   SODIUM mmol/L 135* 136 138 135* 137    POTASSIUM mmol/L 3.3* 4.2 3.8 3.8 4.1   MAGNESIUM mg/dL  --  1.7  --  2.0 1.6   CHLORIDE mmol/L 103 105 103 103 100   CO2 mmol/L 21.5* 19.2* 21.4* 23.4 25.0   BUN mg/dL 6* 7* 8 8 13   CREATININE mg/dL 0.36* 0.36* 0.41* 0.39* 0.43*   EGFR IF NONAFRICN AM mL/min/1.73 >150 >150 >150 >150 149   CALCIUM mg/dL 8.0* 7.8* 8.4* 8.0* 8.7   GLUCOSE mg/dL 106* 112* 105* 131* 115*   ALBUMIN g/dL  --   --  2.56* 2.53* 2.80*   BILIRUBIN mg/dL  --   --  1.0 0.9 0.6   ALK PHOS U/L  --   --  175* 194* 248*   AST (SGOT) U/L  --   --  64* 101* 180*   ALT (SGPT) U/L  --   --  27 33 47*   Estimated Creatinine Clearance: 154 mL/min (A) (by C-G formula based on SCr of 0.36 mg/dL (L)).  Ammonia   Date Value Ref Range Status   03/08/2020 53 (H) 11 - 51 umol/L Final   03/07/2020 59 (H) 11 - 51 umol/L Final     Results from last 7 days   Lab Units 03/05/20  0429   CK TOTAL U/L 166   TROPONIN T ng/mL <0.010     Results from last 7 days   Lab Units 03/08/20  0120 03/06/20  0413   PROBNP pg/mL 1,195.0* 1,002.0*     No results found for: HGBA1C  Lab Results   Component Value Date    TSH 2.150 03/05/2020    FREET4 1.56 03/05/2020         Pain Management Panel     There is no flowsheet data to display.          Blood Culture   Date Value Ref Range Status   03/05/2020 No growth at 4 days  Preliminary   03/05/2020 No growth at 4 days  Preliminary     Urine Culture   Date Value Ref Range Status   03/05/2020 >100,000 CFU/mL Escherichia coli (A)  Final   03/05/2020 >100,000 CFU/mL Mixed Connie Isolated  Final     No results found for: WOUNDCX  No results found for: STOOLCX              Radiology:  Imaging Results (Last 72 Hours)     Procedure Component Value Units Date/Time    FL Video Swallow Single Contrast [[id number]] Collected:  03/09/20 1224     Updated:  03/09/20 1224    Narrative:       FL VIDEO SWALLOW SINGLE-CONTRAST-     CLINICAL INDICATION: dysphagia; S72.22XA-Displaced subtrochanteric  fracture of left femur, initial encounter for closed  fracture;  N30.00-Acute cystitis without hematuria        TECHNIQUE:   Speech therapy service was present. The patient was examined in the  sitting lateral position and was given several consistencies of barium.     FINDINGS: Premature spillage w/ all test consistencies. Deep laryngeal  penetration w/ thin liquids via all presentation styles. Silent  aspiration occurring during and after the swallow. Severely delayed  cough response elicited at end of study. No other laryngeal penetration  or aspiration. Unable to manipulate and swallow whole barium pill.  Retrograde flow noted.     FLUOROSCOPY TIME: 1.5 minutes     Images: Cine loop was acquired       Impression:       Aspiration with thin liquids     For additional information please see the report provided by the speech  therapy service       XR Chest 1 View [[id number]] Collected:  03/09/20 0302     Updated:  03/09/20 0304    Narrative:       CR Chest 1 Vw    INDICATION:   64-year-old female for NG tube placement     COMPARISON:    3/6/2020    FINDINGS:  Single portable AP view(s) of the chest.    There is an enteric tube present and the tip is at the level of the mid body stomach.    Cardiac silhouette is enlarged but stable. Lung volumes remain low. There is no new effusion or dense consolidation. Interstitial prominence in both lungs suspicious for mild volume overload. There is a nodular density in the peripheral upper lobe on the  left that probably represents a calcified granuloma and measures 10 mm. Status post bilateral humerus fracture repair. No pneumothorax.       Impression:       1. The tip of the enteric tube is at the level of the mid body stomach.  2. Persisting cardiomegaly and interstitial prominence which suggests underlying mild volume overload.    Signer Name: Eduardo Valiente MD   Signed: 3/9/2020 3:02 AM   Workstation Name: [email or web address]    [facility] Surgery Fluoro [[id number]] Collected:  03/07/20 154      Updated:  03/07/20 1551    Narrative:       EXAMINATION: FL SURGERY FLUORO-      CLINICAL INDICATION:     ORIF LEFT FEMUR ; S72.22XA-Displaced  subtrochanteric fracture of left femur, initial encounter for closed  fracture; N30.00-Acute cystitis without hematuria     TECHNIQUE: Frontal view of the region of interest..  FLUOROSCOPY TIME: 3 minutes     COMPARISON: NONE      FINDINGS: Fluoroscopy during surgery.       Impression:       As above     This report was finalized on 3/7/2020 3:49 PM by Dr. Mario Brasher MD.               Assessment:   1. S/P left femur ORIF with retrograde nail    Plan:   Continue post-op orthopedic orders, continue splint until follow up appointment with orthopedics. Eliquis resumed yesterday by hospitalist, patient is chronically anticoagulated. Follow up with Dr. Dumont in 2 weeks for re-evaluation.     MARY Jaime  03/09/20  1:15 PM

## 2020-03-10 PROBLEM — K94.20 GASTROSTOMY COMPLICATION (HCC): Status: RESOLVED | Noted: 2019-02-12 | Resolved: 2020-03-10

## 2020-03-10 LAB
ALBUMIN SERPL-MCNC: 2.36 G/DL (ref 3.5–5.2)
ALBUMIN/GLOB SERPL: 0.6 G/DL
ALP SERPL-CCNC: 178 U/L (ref 39–117)
ALT SERPL W P-5'-P-CCNC: 18 U/L (ref 1–33)
ANION GAP SERPL CALCULATED.3IONS-SCNC: 11.8 MMOL/L (ref 5–15)
AST SERPL-CCNC: 52 U/L (ref 1–32)
BACTERIA SPEC AEROBE CULT: NORMAL
BACTERIA SPEC AEROBE CULT: NORMAL
BASOPHILS # BLD AUTO: 0.03 10*3/MM3 (ref 0–0.2)
BASOPHILS NFR BLD AUTO: 0.7 % (ref 0–1.5)
BILIRUB SERPL-MCNC: 0.9 MG/DL (ref 0.2–1.2)
BUN BLD-MCNC: 8 MG/DL (ref 8–23)
BUN/CREAT SERPL: 19 (ref 7–25)
CALCIUM SPEC-SCNC: 8.5 MG/DL (ref 8.6–10.5)
CHLORIDE SERPL-SCNC: 107 MMOL/L (ref 98–107)
CO2 SERPL-SCNC: 20.2 MMOL/L (ref 22–29)
CREAT BLD-MCNC: 0.42 MG/DL (ref 0.57–1)
DEPRECATED RDW RBC AUTO: 56.5 FL (ref 37–54)
EOSINOPHIL # BLD AUTO: 0.06 10*3/MM3 (ref 0–0.4)
EOSINOPHIL NFR BLD AUTO: 1.4 % (ref 0.3–6.2)
ERYTHROCYTE [DISTWIDTH] IN BLOOD BY AUTOMATED COUNT: 18.1 % (ref 12.3–15.4)
GFR SERPL CREATININE-BSD FRML MDRD: >150 ML/MIN/1.73
GLOBULIN UR ELPH-MCNC: 3.9 GM/DL
GLUCOSE BLD-MCNC: 118 MG/DL (ref 65–99)
GLUCOSE BLDC GLUCOMTR-MCNC: 106 MG/DL (ref 70–130)
GLUCOSE BLDC GLUCOMTR-MCNC: 121 MG/DL (ref 70–130)
GLUCOSE BLDC GLUCOMTR-MCNC: 139 MG/DL (ref 70–130)
HBV CORE AB SER DONR QL IA: POSITIVE
HCT VFR BLD AUTO: 26 % (ref 34–46.6)
HGB BLD-MCNC: 8.2 G/DL (ref 12–15.9)
IMM GRANULOCYTES # BLD AUTO: 0.03 10*3/MM3 (ref 0–0.05)
IMM GRANULOCYTES NFR BLD AUTO: 0.7 % (ref 0–0.5)
LYMPHOCYTES # BLD AUTO: 0.39 10*3/MM3 (ref 0.7–3.1)
LYMPHOCYTES NFR BLD AUTO: 8.8 % (ref 19.6–45.3)
MAGNESIUM SERPL-MCNC: 1.8 MG/DL (ref 1.6–2.4)
MCH RBC QN AUTO: 27.2 PG (ref 26.6–33)
MCHC RBC AUTO-ENTMCNC: 31.5 G/DL (ref 31.5–35.7)
MCV RBC AUTO: 86.1 FL (ref 79–97)
MONOCYTES # BLD AUTO: 0.45 10*3/MM3 (ref 0.1–0.9)
MONOCYTES NFR BLD AUTO: 10.2 % (ref 5–12)
NEUTROPHILS # BLD AUTO: 3.46 10*3/MM3 (ref 1.7–7)
NEUTROPHILS NFR BLD AUTO: 78.2 % (ref 42.7–76)
NRBC BLD AUTO-RTO: 0 /100 WBC (ref 0–0.2)
PHOSPHATE SERPL-MCNC: 2 MG/DL (ref 2.5–4.5)
PLATELET # BLD AUTO: 179 10*3/MM3 (ref 140–450)
PMV BLD AUTO: 10 FL (ref 6–12)
POTASSIUM BLD-SCNC: 3.5 MMOL/L (ref 3.5–5.2)
POTASSIUM BLD-SCNC: 4.6 MMOL/L (ref 3.5–5.2)
PROT SERPL-MCNC: 6.3 G/DL (ref 6–8.5)
RBC # BLD AUTO: 3.02 10*6/MM3 (ref 3.77–5.28)
SODIUM BLD-SCNC: 139 MMOL/L (ref 136–145)
WBC NRBC COR # BLD: 4.42 10*3/MM3 (ref 3.4–10.8)

## 2020-03-10 PROCEDURE — 82962 GLUCOSE BLOOD TEST: CPT

## 2020-03-10 PROCEDURE — 84100 ASSAY OF PHOSPHORUS: CPT | Performed by: INTERNAL MEDICINE

## 2020-03-10 PROCEDURE — 92526 ORAL FUNCTION THERAPY: CPT | Performed by: SPEECH-LANGUAGE PATHOLOGIST

## 2020-03-10 PROCEDURE — 25010000002 NA FERRIC GLUC CPLX PER 12.5 MG: Performed by: HOSPITALIST

## 2020-03-10 PROCEDURE — 94799 UNLISTED PULMONARY SVC/PX: CPT

## 2020-03-10 PROCEDURE — 25010000002 DIGOXIN PER 500 MCG: Performed by: HOSPITALIST

## 2020-03-10 PROCEDURE — 99232 SBSQ HOSP IP/OBS MODERATE 35: CPT | Performed by: INTERNAL MEDICINE

## 2020-03-10 PROCEDURE — 80053 COMPREHEN METABOLIC PANEL: CPT | Performed by: INTERNAL MEDICINE

## 2020-03-10 PROCEDURE — 25010000003 MAGNESIUM SULFATE 4 GM/100ML SOLUTION: Performed by: INTERNAL MEDICINE

## 2020-03-10 PROCEDURE — 84132 ASSAY OF SERUM POTASSIUM: CPT | Performed by: INTERNAL MEDICINE

## 2020-03-10 PROCEDURE — 85025 COMPLETE CBC W/AUTO DIFF WBC: CPT | Performed by: INTERNAL MEDICINE

## 2020-03-10 PROCEDURE — 83735 ASSAY OF MAGNESIUM: CPT | Performed by: INTERNAL MEDICINE

## 2020-03-10 RX ORDER — FUROSEMIDE 40 MG/1
40 TABLET ORAL DAILY
Status: DISCONTINUED | OUTPATIENT
Start: 2020-03-10 | End: 2020-03-11 | Stop reason: HOSPADM

## 2020-03-10 RX ORDER — MAGNESIUM SULFATE HEPTAHYDRATE 40 MG/ML
4 INJECTION, SOLUTION INTRAVENOUS ONCE
Status: COMPLETED | OUTPATIENT
Start: 2020-03-10 | End: 2020-03-10

## 2020-03-10 RX ORDER — POTASSIUM CHLORIDE 1.5 G/1.77G
40 POWDER, FOR SOLUTION ORAL EVERY 4 HOURS
Status: COMPLETED | OUTPATIENT
Start: 2020-03-10 | End: 2020-03-10

## 2020-03-10 RX ADMIN — LEVETIRACETAM 250 MG: 250 TABLET, FILM COATED ORAL at 16:30

## 2020-03-10 RX ADMIN — SODIUM CHLORIDE, PRESERVATIVE FREE 10 ML: 5 INJECTION INTRAVENOUS at 08:37

## 2020-03-10 RX ADMIN — CEFDINIR 300 MG: 300 CAPSULE ORAL at 08:36

## 2020-03-10 RX ADMIN — METRONIDAZOLE 500 MG: 250 TABLET ORAL at 06:00

## 2020-03-10 RX ADMIN — FAMOTIDINE 20 MG: 20 TABLET ORAL at 06:27

## 2020-03-10 RX ADMIN — APIXABAN 5 MG: 5 TABLET, FILM COATED ORAL at 06:00

## 2020-03-10 RX ADMIN — PRIMIDONE 250 MG: 250 TABLET ORAL at 16:30

## 2020-03-10 RX ADMIN — METRONIDAZOLE 500 MG: 250 TABLET ORAL at 22:14

## 2020-03-10 RX ADMIN — LEVETIRACETAM 250 MG: 250 TABLET, FILM COATED ORAL at 06:00

## 2020-03-10 RX ADMIN — LACTULOSE 20 G: 10 SOLUTION ORAL at 22:45

## 2020-03-10 RX ADMIN — FUROSEMIDE 40 MG: 40 TABLET ORAL at 23:26

## 2020-03-10 RX ADMIN — HYOSCYAMINE SULFATE 125 MCG: 0.12 TABLET, ORALLY DISINTEGRATING ORAL at 22:12

## 2020-03-10 RX ADMIN — DIGOXIN 125 MCG: 0.25 INJECTION INTRAMUSCULAR; INTRAVENOUS at 11:07

## 2020-03-10 RX ADMIN — MAGNESIUM SULFATE IN WATER 4 G: 40 INJECTION, SOLUTION INTRAVENOUS at 08:35

## 2020-03-10 RX ADMIN — LACTULOSE 20 G: 10 SOLUTION ORAL at 08:35

## 2020-03-10 RX ADMIN — POTASSIUM CHLORIDE 40 MEQ: 1.5 POWDER, FOR SOLUTION ORAL at 08:36

## 2020-03-10 RX ADMIN — FOLIC ACID 1 MG: 1 TABLET ORAL at 08:35

## 2020-03-10 RX ADMIN — LEVETIRACETAM 250 MG: 250 TABLET, FILM COATED ORAL at 22:12

## 2020-03-10 RX ADMIN — SODIUM CHLORIDE 125 MG: 9 INJECTION, SOLUTION INTRAVENOUS at 08:35

## 2020-03-10 RX ADMIN — PRIMIDONE 250 MG: 250 TABLET ORAL at 22:12

## 2020-03-10 RX ADMIN — METRONIDAZOLE 500 MG: 250 TABLET ORAL at 13:17

## 2020-03-10 RX ADMIN — POTASSIUM CHLORIDE 40 MEQ: 1.5 POWDER, FOR SOLUTION ORAL at 11:07

## 2020-03-10 RX ADMIN — CEFDINIR 300 MG: 300 CAPSULE ORAL at 22:13

## 2020-03-10 RX ADMIN — Medication 1 CAPSULE: at 08:36

## 2020-03-10 RX ADMIN — FAMOTIDINE 20 MG: 20 TABLET ORAL at 16:30

## 2020-03-10 RX ADMIN — SODIUM PHOSPHATE, MONOBASIC, MONOHYDRATE 15 MMOL: 276; 142 INJECTION, SOLUTION INTRAVENOUS at 23:24

## 2020-03-10 RX ADMIN — PAROXETINE HYDROCHLORIDE 10 MG: 20 TABLET, FILM COATED ORAL at 06:00

## 2020-03-10 RX ADMIN — APIXABAN 5 MG: 5 TABLET, FILM COATED ORAL at 16:30

## 2020-03-10 RX ADMIN — PRIMIDONE 250 MG: 250 TABLET ORAL at 08:36

## 2020-03-10 RX ADMIN — SODIUM CHLORIDE, PRESERVATIVE FREE 10 ML: 5 INJECTION INTRAVENOUS at 22:15

## 2020-03-10 NOTE — PLAN OF CARE
Problem: Patient Care Overview  Goal: Plan of Care Review  Outcome: Ongoing (interventions implemented as appropriate)  Flowsheets (Taken 3/10/2020 0503)  Progress: improving  Plan of Care Reviewed With: patient  Note:   Patient continues to have diarrhea. She is resting quietly at this time. Will continue to monitor

## 2020-03-10 NOTE — PLAN OF CARE
Problem: Patient Care Overview  Goal: Plan of Care Review  Outcome: Ongoing (interventions implemented as appropriate)  Flowsheets (Taken 3/10/2020 6201)  Progress: improving  Outcome Summary: Pt has done well this shift, resting comfortably with no distress.  Tolerating PO diet

## 2020-03-10 NOTE — PROGRESS NOTES
Antimicrobial Length of Therapy:    Day 2 of 3 cefdinir (day 6 including initial ceftriaxone)  Day 5 of 7 metronidazole    Thank you.  Emelia French, Pharm.D.  3/10/2020  11:38

## 2020-03-10 NOTE — PLAN OF CARE
SLP f/u for diet safety/acceptance. Pt is s/p MBS yesterday w/ SLP recs of puree consistency w/ nectar thick liquids. Continue modified po diet. SLP will f/u for continued diet safety/acceptance.  Problem: Patient Care Overview  Goal: Plan of Care Review  Outcome: Ongoing (interventions implemented as appropriate)  Flowsheets (Taken 3/10/2020 0900 by Shayna Nguyen RN)  Plan of Care Reviewed With: patient

## 2020-03-10 NOTE — THERAPY TREATMENT NOTE
Acute Care - Speech Language Pathology   Swallow Treatment Note RUBÉN Hardy     Patient Name: Bere Caal  : 1955  MRN: [id number]  Today's Date: 3/10/2020               Admit Date: 3/5/2020    Visit Dx:      ICD-10-CM ICD-9-CM   1. Closed displaced subtrochanteric fracture of left femur, initial encounter (CMS/Formerly [facility]) S72.22XA 820.22   2. Acute cystitis without hematuria N30.00 595.0     Patient Active Problem List   Diagnosis   • Gastrostomy complication (CMS/Formerly [facility])   • Closed displaced subtrochanteric fracture of left femur (CMS/Formerly [facility])       Therapy Treatment       Ms. Caal is seen at bedside this pm for diet safety/acceptance. She is s/p MBS on 3/9/2020 w/ SLP recs of puree consistency w/ nectar thick liquids. This is consistent w/ her premorbid baseline diet. D/w RN who reports decreased po intake in comparison to yesterday after MBS was completed.    She is resting upon entry, however easily awakens w/ verbal stimuli. She is repositioned to upright and centered in bed. She is agreeable to accept po presentations. Pt requesting milk and ice cream. Pt requests multiple presentations of magic cup via spoon bowl w/o overt s/s of aspiration. No s/s concerning for silent aspiration. She accepts ~4 oz of nectar thick milk via straw w/o overt s/s of aspiration. No s/s concerning for silent aspiration. She is noted w/ some anterior loss felt to be 2/2 positioning.     She is continuing to tolerate least restrictive level of po intake w/o overt s/s of aspiration. Will continue to f/u w/ SLP to f/u for diet safety/acceptance.     Outcome Summary    Continue modified po diet. SLP to f/u for diet safety/acceptance.      EDUCATION  The patient has been educated in the following areas:   Dysphagia (Swallowing Impairment) Oral Care/Hydration Modified Diet Instruction.    SLP Recommendation and Plan        Continue modified po diet. SLP to f/u for diet safety/acceptance.    Time Calculation:   Time Calculation- SLP     Time  Calculation- SLP     Row Name 03/10/20 1501    SLP - Next Appointment  03/11/20  -Recorded by: JOSÉ MIGUEL            User Key     Initials Name Provider Type    Nicki Jay, MS CCC-SLP Speech and Language Pathologist          Therapy Charges for Today     Code Description Service Date Service Provider Modifiers Qty    88780586592 HC ST MOTION FLUORO EVAL SWALLOW 8 3/9/2020 Nicki Leiva, MS CCC-SLP GN 1    21097156458 HC ST TREATMENT SWALLOW 2 3/10/2020 Nicki Leiva, MS CCC-SLP GN 1                 Nicki Leiva MS CCC-SLP  3/10/2020

## 2020-03-11 ENCOUNTER — APPOINTMENT (OUTPATIENT)
Dept: GENERAL RADIOLOGY | Facility: HOSPITAL | Age: 65
End: 2020-03-11

## 2020-03-11 VITALS
BODY MASS INDEX: 25.8 KG/M2 | OXYGEN SATURATION: 94 % | DIASTOLIC BLOOD PRESSURE: 59 MMHG | RESPIRATION RATE: 16 BRPM | TEMPERATURE: 98.3 F | HEART RATE: 89 BPM | SYSTOLIC BLOOD PRESSURE: 91 MMHG | HEIGHT: 64 IN | WEIGHT: 151.1 LBS

## 2020-03-11 LAB
ABO GROUP BLD: NORMAL
ALBUMIN SERPL-MCNC: 2.42 G/DL (ref 3.5–5.2)
ALBUMIN/GLOB SERPL: 0.6 G/DL
ALP SERPL-CCNC: 175 U/L (ref 39–117)
ALT SERPL W P-5'-P-CCNC: 16 U/L (ref 1–33)
ANION GAP SERPL CALCULATED.3IONS-SCNC: 11.6 MMOL/L (ref 5–15)
AST SERPL-CCNC: 55 U/L (ref 1–32)
BASOPHILS # BLD AUTO: 0.03 10*3/MM3 (ref 0–0.2)
BASOPHILS NFR BLD AUTO: 0.7 % (ref 0–1.5)
BILIRUB SERPL-MCNC: 1 MG/DL (ref 0.2–1.2)
BLD GP AB SCN SERPL QL: NEGATIVE
BUN BLD-MCNC: 8 MG/DL (ref 8–23)
BUN/CREAT SERPL: 19.5 (ref 7–25)
CALCIUM SPEC-SCNC: 8 MG/DL (ref 8.6–10.5)
CHLORIDE SERPL-SCNC: 109 MMOL/L (ref 98–107)
CO2 SERPL-SCNC: 21.4 MMOL/L (ref 22–29)
CREAT BLD-MCNC: 0.41 MG/DL (ref 0.57–1)
CRP SERPL-MCNC: 6.03 MG/DL (ref 0–0.5)
DEPRECATED RDW RBC AUTO: 59.7 FL (ref 37–54)
EOSINOPHIL # BLD AUTO: 0.07 10*3/MM3 (ref 0–0.4)
EOSINOPHIL NFR BLD AUTO: 1.7 % (ref 0.3–6.2)
ERYTHROCYTE [DISTWIDTH] IN BLOOD BY AUTOMATED COUNT: 18.6 % (ref 12.3–15.4)
GFR SERPL CREATININE-BSD FRML MDRD: >150 ML/MIN/1.73
GLOBULIN UR ELPH-MCNC: 3.8 GM/DL
GLUCOSE BLD-MCNC: 108 MG/DL (ref 65–99)
GLUCOSE BLDC GLUCOMTR-MCNC: 108 MG/DL (ref 70–130)
GLUCOSE BLDC GLUCOMTR-MCNC: 119 MG/DL (ref 70–130)
GLUCOSE BLDC GLUCOMTR-MCNC: 122 MG/DL (ref 70–130)
HBV E AB SERPL QL IA: POSITIVE
HCT VFR BLD AUTO: 24.8 % (ref 34–46.6)
HGB BLD-MCNC: 7.5 G/DL (ref 12–15.9)
IMM GRANULOCYTES # BLD AUTO: 0.04 10*3/MM3 (ref 0–0.05)
IMM GRANULOCYTES NFR BLD AUTO: 1 % (ref 0–0.5)
LYMPHOCYTES # BLD AUTO: 0.74 10*3/MM3 (ref 0.7–3.1)
LYMPHOCYTES NFR BLD AUTO: 18 % (ref 19.6–45.3)
MAGNESIUM SERPL-MCNC: 2.1 MG/DL (ref 1.6–2.4)
MCH RBC QN AUTO: 27 PG (ref 26.6–33)
MCHC RBC AUTO-ENTMCNC: 30.2 G/DL (ref 31.5–35.7)
MCV RBC AUTO: 89.2 FL (ref 79–97)
MONOCYTES # BLD AUTO: 0.57 10*3/MM3 (ref 0.1–0.9)
MONOCYTES NFR BLD AUTO: 13.9 % (ref 5–12)
NEUTROPHILS # BLD AUTO: 2.65 10*3/MM3 (ref 1.7–7)
NEUTROPHILS NFR BLD AUTO: 64.7 % (ref 42.7–76)
NRBC BLD AUTO-RTO: 0.5 /100 WBC (ref 0–0.2)
PHOSPHATE SERPL-MCNC: 2.5 MG/DL (ref 2.5–4.5)
PLATELET # BLD AUTO: 176 10*3/MM3 (ref 140–450)
PMV BLD AUTO: 9.9 FL (ref 6–12)
POTASSIUM BLD-SCNC: 3.7 MMOL/L (ref 3.5–5.2)
PROT SERPL-MCNC: 6.2 G/DL (ref 6–8.5)
RBC # BLD AUTO: 2.78 10*6/MM3 (ref 3.77–5.28)
RH BLD: POSITIVE
SODIUM BLD-SCNC: 142 MMOL/L (ref 136–145)
T&S EXPIRATION DATE: NORMAL
WBC NRBC COR # BLD: 4.1 10*3/MM3 (ref 3.4–10.8)

## 2020-03-11 PROCEDURE — 25010000002 NA FERRIC GLUC CPLX PER 12.5 MG: Performed by: HOSPITALIST

## 2020-03-11 PROCEDURE — 86923 COMPATIBILITY TEST ELECTRIC: CPT

## 2020-03-11 PROCEDURE — 80053 COMPREHEN METABOLIC PANEL: CPT | Performed by: INTERNAL MEDICINE

## 2020-03-11 PROCEDURE — 82962 GLUCOSE BLOOD TEST: CPT

## 2020-03-11 PROCEDURE — 86140 C-REACTIVE PROTEIN: CPT | Performed by: INTERNAL MEDICINE

## 2020-03-11 PROCEDURE — P9016 RBC LEUKOCYTES REDUCED: HCPCS

## 2020-03-11 PROCEDURE — 99239 HOSP IP/OBS DSCHRG MGMT >30: CPT | Performed by: INTERNAL MEDICINE

## 2020-03-11 PROCEDURE — 25010000002 DIGOXIN PER 500 MCG: Performed by: HOSPITALIST

## 2020-03-11 PROCEDURE — 92526 ORAL FUNCTION THERAPY: CPT | Performed by: SPEECH-LANGUAGE PATHOLOGIST

## 2020-03-11 PROCEDURE — 36430 TRANSFUSION BLD/BLD COMPNT: CPT

## 2020-03-11 PROCEDURE — 86900 BLOOD TYPING SEROLOGIC ABO: CPT | Performed by: INTERNAL MEDICINE

## 2020-03-11 PROCEDURE — 86850 RBC ANTIBODY SCREEN: CPT | Performed by: INTERNAL MEDICINE

## 2020-03-11 PROCEDURE — 86900 BLOOD TYPING SEROLOGIC ABO: CPT

## 2020-03-11 PROCEDURE — 94799 UNLISTED PULMONARY SVC/PX: CPT

## 2020-03-11 PROCEDURE — 85025 COMPLETE CBC W/AUTO DIFF WBC: CPT | Performed by: INTERNAL MEDICINE

## 2020-03-11 PROCEDURE — 83735 ASSAY OF MAGNESIUM: CPT | Performed by: INTERNAL MEDICINE

## 2020-03-11 PROCEDURE — 84100 ASSAY OF PHOSPHORUS: CPT | Performed by: INTERNAL MEDICINE

## 2020-03-11 PROCEDURE — 86901 BLOOD TYPING SEROLOGIC RH(D): CPT | Performed by: INTERNAL MEDICINE

## 2020-03-11 RX ORDER — IPRATROPIUM BROMIDE AND ALBUTEROL SULFATE 2.5; .5 MG/3ML; MG/3ML
3 SOLUTION RESPIRATORY (INHALATION) EVERY 6 HOURS PRN
Qty: 360 ML
Start: 2020-03-11

## 2020-03-11 RX ORDER — DIGOXIN 125 MCG
125 TABLET ORAL
Start: 2020-03-11

## 2020-03-11 RX ORDER — LACTULOSE 10 G/15ML
20 SOLUTION ORAL 2 TIMES DAILY
Start: 2020-03-11

## 2020-03-11 RX ORDER — METRONIDAZOLE 500 MG/1
500 TABLET ORAL EVERY 8 HOURS SCHEDULED
Qty: 4 TABLET | Refills: 0
Start: 2020-03-11 | End: 2020-03-13

## 2020-03-11 RX ORDER — CEFDINIR 300 MG/1
300 CAPSULE ORAL EVERY 12 HOURS SCHEDULED
Qty: 2 CAPSULE | Refills: 0
Start: 2020-03-11 | End: 2020-03-12

## 2020-03-11 RX ORDER — FAMOTIDINE 20 MG/1
20 TABLET, FILM COATED ORAL
Start: 2020-03-11

## 2020-03-11 RX ORDER — IRON POLYSACCHARIDE COMPLEX 150 MG
150 CAPSULE ORAL DAILY
Start: 2020-03-13

## 2020-03-11 RX ADMIN — SODIUM CHLORIDE, PRESERVATIVE FREE 10 ML: 5 INJECTION INTRAVENOUS at 08:47

## 2020-03-11 RX ADMIN — SODIUM CHLORIDE 125 MG: 9 INJECTION, SOLUTION INTRAVENOUS at 08:24

## 2020-03-11 RX ADMIN — LEVETIRACETAM 250 MG: 250 TABLET, FILM COATED ORAL at 06:03

## 2020-03-11 RX ADMIN — FOLIC ACID 1 MG: 1 TABLET ORAL at 08:23

## 2020-03-11 RX ADMIN — Medication 1 CAPSULE: at 08:23

## 2020-03-11 RX ADMIN — APIXABAN 5 MG: 5 TABLET, FILM COATED ORAL at 16:41

## 2020-03-11 RX ADMIN — FAMOTIDINE 20 MG: 20 TABLET ORAL at 08:23

## 2020-03-11 RX ADMIN — PRIMIDONE 250 MG: 250 TABLET ORAL at 15:13

## 2020-03-11 RX ADMIN — FAMOTIDINE 20 MG: 20 TABLET ORAL at 16:41

## 2020-03-11 RX ADMIN — PRIMIDONE 250 MG: 250 TABLET ORAL at 08:23

## 2020-03-11 RX ADMIN — PAROXETINE HYDROCHLORIDE 10 MG: 20 TABLET, FILM COATED ORAL at 06:02

## 2020-03-11 RX ADMIN — APIXABAN 5 MG: 5 TABLET, FILM COATED ORAL at 06:03

## 2020-03-11 RX ADMIN — LEVETIRACETAM 250 MG: 250 TABLET, FILM COATED ORAL at 16:41

## 2020-03-11 RX ADMIN — CEFDINIR 300 MG: 300 CAPSULE ORAL at 08:23

## 2020-03-11 RX ADMIN — DIGOXIN 125 MCG: 0.25 INJECTION INTRAMUSCULAR; INTRAVENOUS at 11:38

## 2020-03-11 RX ADMIN — METRONIDAZOLE 500 MG: 250 TABLET ORAL at 15:13

## 2020-03-11 RX ADMIN — METRONIDAZOLE 500 MG: 250 TABLET ORAL at 06:03

## 2020-03-11 NOTE — NURSING NOTE
Rush Islas along with Ilana MONTE at bedside and change OCL long leg splint using 35 inches of the 5inch OCL.Patient tolerated well and no complications noted.

## 2020-03-11 NOTE — DISCHARGE INSTR - ACTIVITY
Keep splint in place left femur fracture until follow up appointment with Dr. Dumont.  Keep splint clean and dry.

## 2020-03-11 NOTE — PLAN OF CARE
Problem: Patient Care Overview  Goal: Plan of Care Review  Outcome: Ongoing (interventions implemented as appropriate)  Flowsheets (Taken 3/11/2020 1604)  Outcome Summary: Patient has rested well, new splint applied per lisandro Hughes and JAE ca at bedside, patient receiving 1 unit of PRBC, plan is for patient to be dc'd back to nursing home totay, vital signs stable, no distress noted, will continue to monitor.

## 2020-03-11 NOTE — PROGRESS NOTES
[facility] HOSPITALIST PROGRESS NOTE     Patient Identification:  Name:  Bere Caal  Age:  64 y.o.  Sex:  female  :  1955  MRN:  [id number]  Visit Number:  [id number]  ROOM: [address] [place] [address]     Primary Care Provider:  Kris Garcia MD     Date of Admission: 3/5/2020    Length of stay in inpatient status:  5    Subjective     Chief Compliant:    Chief Complaint   Patient presents with   • Leg Pain       History of Presenting Illness:  Patient is a 63 yo female who was admitted on 3/5/2020 with leg pain; she was being transferred in the bed and the staff heard a snapping sound.  Imaging in the ED showed a left mid to distal femur shaft that was moderately displaced and angulated; she underwent repair with a retrograde femoral nail from [email or web address] on 3/7/2020.    Today, my saw the patient, she was receiving a bed bath.  She was not able to communicate with me.  She would not even look at me when I talked to her.  As a result of the patient being nonverbal, I am unable to obtain a history from her.  Please note that there are no family members at bedside.    Objective     Current Hospital Meds:  apixaban 5 mg Oral Q12H   cefdinir 300 mg Oral Q12H   digoxin 125 mcg Intravenous Daily   famotidine 20 mg Oral BID AC   ferric gluconate (FERRLECIT) IVPB 125 mg Intravenous Daily   folic acid 1 mg Oral Daily   hyoscyamine 125 mcg Sublingual Nightly   [START ON 3/13/2020] iron polysaccharides 150 mg Oral Daily   lactobacillus acidophilus 1 capsule Oral Daily   lactulose 20 g Oral BID   levETIRAcetam 250 mg Oral Q12H   metoprolol tartrate 25 mg Oral Q12H   metroNIDAZOLE 500 mg Oral Q8H   PARoxetine 10 mg Oral QAM   primidone 250 mg Oral TID   sodium chloride 10 mL Intravenous Q12H        Current Antimicrobial Therapy:  Anti-Infectives (From admission, onward)    Ordered     Dose/Rate Route Frequency Start Stop    20  metroNIDAZOLE (FLAGYL) tablet 500 mg  Review   Ordering Provider:  Mickey  True HOWE MD    500 mg  over 60 Minutes Oral Every 8 Hours Scheduled 03/09/20 2300 03/12/20 2159 03/09/20 2131  cefdinir (OMNICEF) capsule 300 mg  Review   Ordering Provider:  True Arevalo MD    300 mg Oral Every 12 Hours Scheduled 03/09/20 2230 03/12/20 2059 03/07/20 1438  cefTRIAXone (ROCEPHIN) 2 g/100 mL 0.9% NS VTB (MIKHAIL)     Ordering Provider:  Modesto Dumont MD    2 g  over 30 Minutes Intravenous Once 03/07/20 1500 03/07/20 1456    03/05/20 0544  cefTRIAXone (ROCEPHIN) 2 g/100 mL 0.9% NS VTB (MIKHAIL)     Ordering Provider:  Jc Peña MD    2 g  over 30 Minutes Intravenous Once 03/05/20 0546 03/05/20 0647        Current Diuretic Therapy:  Diuretics (From admission, onward)    None        ----------------------------------------------------------------------------------------------------------------------  Vital Signs:  Temp:  [98.1 °F (36.7 °C)-100.3 °F (37.9 °C)] 99 °F (37.2 °C)  Heart Rate:  [] 113  Resp:  [16] 16  BP: ()/(64-71) 109/66  SpO2:  [97 %] 97 %  on  Flow (L/min):  [3] 3;   Device (Oxygen Therapy): room air  Body mass index is 25.94 kg/m².    Wt Readings from Last 3 Encounters:   03/06/20 68.5 kg (151 lb 1.6 oz)     Intake & Output (last 3 days)       03/08 0701 - 03/09 0700 03/09 0701 - 03/10 0700 03/10 0701 - 03/11 0700    P.O. 0 540 600    IV Piggyback  200     Total Intake(mL/kg) 0 (0) 740 (10.8) 600 (8.8)    Urine (mL/kg/hr) 550 (0.3) 150 (0.1)     Stool 0 0     Blood       Total Output 550 150     Net -550 +590 +600           Urine Unmeasured Occurrence  3 x 5 x    Stool Unmeasured Occurrence 1 x 8 x 4 x        Diet Pureed; Nectar / Syrup Thick; Consistent Carbohydrate  ----------------------------------------------------------------------------------------------------------------------  Physical Exam   Constitutional: She appears well-developed and well-nourished. No distress.   HENT:   Head: Normocephalic and atraumatic.   Nose: Nose normal.   Eyes:  Pupils are equal, round, and reactive to light. Conjunctivae and EOM are normal.   Cardiovascular: Intact distal pulses. An irregularly irregular rhythm present.   No murmur heard.  Pulmonary/Chest: Effort normal and breath sounds normal. No respiratory distress. She has no wheezes.   Abdominal: Soft. Bowel sounds are normal. She exhibits no distension.   Skin: Capillary refill takes less than 2 seconds. No rash noted. No erythema. No pallor.     ----------------------------------------------------------------------------------------------------------------------  Tele:  A. fib with heart rates in the 105-115; I have personally reviewed/looked at the telemetry strips.  ----------------------------------------------------------------------------------------------------------------------  LABS:    CBC and coagulation:  Results from last 7 days   Lab Units 03/10/20  0402 03/09/20  0142 03/08/20  1340 03/08/20  0120 03/07/20  1044  03/06/20  0413 03/05/20  0926 03/05/20  0429   LACTATE mmol/L  --   --   --   --   --   --   --   --  1.2   SED RATE mm/hr  --   --   --   --   --   --   --   --  62*   CRP mg/dL  --  13.18*  --  12.55* 14.61*  --  12.77*  --  7.46*   WBC 10*3/mm3 4.42 4.36  --  4.77  --    < > 4.80  --  4.32   HEMOGLOBIN g/dL 8.2* 8.0* 8.1* 7.9*  --    < > 8.9* 10.4* 9.7*   HEMATOCRIT % 26.0* 25.0* 26.1* 25.4*  --    < > 29.5* 33.0* 30.9*   MCV fL 86.1 85.6  --  87.3  --    < > 89.9  --  84.4   MCHC g/dL 31.5 32.0  --  31.1*  --    < > 30.2*  --  31.4*   PLATELETS 10*3/mm3 179 144  --  133*  --    < > 133*  --  153   INR   --   --   --   --   --   --   --  1.12*  --     < > = values in this interval not displayed.     Renal and electrolytes:  Results from last 7 days   Lab Units 03/10/20  1549 03/10/20  0402 03/09/20  0142 03/08/20  0120  03/06/20  0413 03/05/20  0429   SODIUM mmol/L  --  139 135* 136   < > 135* 137   POTASSIUM mmol/L 4.6 3.5 3.3* 4.2   < > 3.8 4.1   MAGNESIUM mg/dL  --  1.8  --  1.7  --   2.0 1.6   CHLORIDE mmol/L  --  107 103 105   < > 103 100   CO2 mmol/L  --  20.2* 21.5* 19.2*   < > 23.4 25.0   BUN mg/dL  --  8 6* 7*   < > 8 13   CREATININE mg/dL  --  0.42* 0.36* 0.36*   < > 0.39* 0.43*   EGFR IF NONAFRICN AM mL/min/1.73  --  >150 >150 >150   < > >150 149   CALCIUM mg/dL  --  8.5* 8.0* 7.8*   < > 8.0* 8.7   PHOSPHORUS mg/dL  --  2.0*  --   --   --   --  2.8   GLUCOSE mg/dL  --  118* 106* 112*   < > 131* 115*    < > = values in this interval not displayed.     Estimated Creatinine Clearance: 132 mL/min (A) (by C-G formula based on SCr of 0.42 mg/dL (L)).    Liver and pancreatic function:  Results from last 7 days   Lab Units 03/10/20  0402 03/08/20  0120 03/07/20  1044 03/07/20  0953 03/06/20  0413  03/05/20  0429   ALBUMIN g/dL 2.36*  --  2.56*  --  2.53*  --  2.80*   BILIRUBIN mg/dL 0.9  --  1.0  --  0.9  --  0.6   ALK PHOS U/L 178*  --  175*  --  194*  --  248*   AST (SGOT) U/L 52*  --  64*  --  101*  --  180*   ALT (SGPT) U/L 18  --  27  --  33  --  47*   AMMONIA umol/L  --  53*  --  59* 105*   < >  --    LIPASE U/L  --   --   --   --   --   --  35    < > = values in this interval not displayed.     Endocrine function:  Point of care bedside glucose levels:  Results from last 7 days   Lab Units 03/10/20  1629 03/10/20  1110 03/10/20  0653 03/09/20  2025 03/09/20  1642 03/09/20  1120 03/09/20  0554 03/09/20  0125 03/08/20  1705 03/08/20  1145 03/08/20  0709 03/08/20  0005 03/07/20  2129 03/07/20  1705   GLUCOSE mg/dL 139* 106 121 154* 129 142* 121 97 96 101 105 118 116 115     Glucose levels from the CMP:  Results from last 7 days   Lab Units 03/10/20  0402 03/09/20  0142 03/08/20  0120 03/07/20  1044 03/06/20  0413 03/05/20  0429   GLUCOSE mg/dL 118* 106* 112* 105* 131* 115*     Lab Results   Component Value Date    TSH 2.150 03/05/2020    FREET4 1.56 03/05/2020     Cardiac:  Results from last 7 days   Lab Units 03/08/20  0120 03/06/20  0413 03/05/20  0429   CK TOTAL U/L  --   --  166      TROPONIN T ng/mL  --   --  <0.010   PROBNP pg/mL 1,195.0* 1,002.0*  --        Cultures:  Brief Urine Lab Results  (Last result in the past 365 days)      Color   Clarity   Blood   Leuk Est   Nitrite   Protein   CREAT   Urine HCG        03/05/20 0452 Dark Yellow Turbid Large (3+) Moderate (2+) Positive 30 mg/dL (1+)             Lab Results   Component Value Date    URINECX >100,000 CFU/mL Escherichia coli (A) 03/05/2020    URINECX >100,000 CFU/mL Mixed Connie Isolated 03/05/2020     Lab Results   Component Value Date    BLOODCX No growth at 5 days 03/05/2020    BLOODCX No growth at 5 days 03/05/2020       I have personally looked at the labs and they are stated above.    Assessment & Plan      -Displaced oblique distal left femur fracture, with mild hemorrhage in surrounding muscle  -Sepsis that was present on admission (, temp 100.1, CRP 7.46) due to an E. coli UTI urinary tract infection  -Hyperammonemia and elevated transaminases and alkaline phosphatase, which have improved  -Mental handicap  -Atrial fibrillation with RVR  -Hemiplegia and hemiparesis, hx of nontraumatic intracerebral hemorrhage  -Dysphagia  -History of seizures  -Acute hypokalemia and acute hypomagnesemia and acute hypophosphatemia  -Left atrial cavity size is moderate-to-severely dilated  -Prolonged QTc of 489 ms  -Normocytic anemia, some of which is due to acute blood loss anemia (no transfusions yet this admission)  -Mild mitral valve regurgitation and mild tricuspid regurgitation  -Left atrial cavity size is moderate-to-severely dilated    The patient is eating 25 to 50% of her meals so far this hospitalization.  We will keep her another day and monitor her ins and outs.  I have also changed her antibiotics over to oral formulation so that we can see if she can tolerate this prior to going back to the nursing home.  If the patient's labs are stable and she has no issues overnight then she may be able to go back to the nursing home  tomorrow.  I have written for phosphorus replacement.    VTE Prophylaxis:   Mechanical Order History:     None      Pharmalogical Order History:     Ordered     Dose Route Frequency Stop    03/08/20 1821  apixaban (ELIQUIS) tablet 5 mg      5 mg PO Every 12 Hours Scheduled --    03/07/20 0855  heparin (porcine) 5000 UNIT/ML injection 5,000 Units  Status:  Discontinued      5,000 Units SC Every 12 Hours Scheduled 03/07/20 0956    Pending  apixaban (ELIQUIS) tablet 5 mg      5 mg PO Every 12 Hours Scheduled --        True Arevalo MD  [facility] Hospitalist  03/10/20  20:26

## 2020-03-11 NOTE — PROCEDURES
Left lower extremity long leg ortho-glass splint was visibly soiled with stool.     New ortho-glass splint long leg applied with assistance by JAE Islas. Patient tolerated procedure well. No complaints at this time.

## 2020-03-11 NOTE — THERAPY TREATMENT NOTE
"Acute Care - Speech Language Pathology   Swallow Treatment Note  Antony     Patient Name: Beer Caal  : 1955  MRN: [id number]  Today's Date: 3/11/2020               Admit Date: 3/5/2020    Visit Dx:      ICD-10-CM ICD-9-CM   1. Closed displaced subtrochanteric fracture of left femur, initial encounter (CMS/[facility]) S72.22XA 820.22   2. Acute cystitis without hematuria N30.00 595.0     Patient Active Problem List   Diagnosis   • Closed displaced subtrochanteric fracture of left femur (CMS/[facility])       Therapy Treatment       Ms. Caal is seen at bedside this pm for diet safety/acceptance. She is s/p MBS on 3/9/2020 w/ SLP recs of puree consistency w/ nectar thick liquids. This is consistent w/ her premorbid baseline diet. D/w RN who reports decreased po intake again. She reports minimal po intake at breakfast this am as well as less than 25% of po lunch tray. She reports tentative d/c back to snf this pm.    She is resting upon entry, however easily awakens w/ verbal stimuli. She is repositioned to upright and centered in bed. She is agreeable to accept po presentations. Pt requesting \"milk\" only today. Pt adamantly declines to accept puree consistency for diet safety/acceptance She accepts ~3 oz of nectar thick milk via straw w/o overt s/s of aspiration. No s/s concerning for silent aspiration. She is noted w/ some anterior loss felt to be 2/2 positioning.     She is continuing to tolerate least restrictive level of po intake w/o overt s/s of aspiration. This is felt to be most representative of pt's premorbid baseline diet. Therefore no further SLP f/u warranted at this time.    Thank you-  Nicki Leiva M.S., CCC-SLP    Outcome Summary    Continue modified po diet of puree consistency w/ nectar thick liquids. 1:1 assist w/ all po intake.     EDUCATION  The patient has been educated in the following areas:   Dysphagia (Swallowing Impairment) Oral Care/Hydration Modified Diet Instruction.    SLP " Recommendation and Plan    1. Puree consistency w/ nectar thick liquids.  2. Meds crushed or given via non-oral method.   3. BROOK precautions.   4. Oral care protocol.   5. Universal aspiration precautions  6. 1:1 assist w/ all po intake  7. Upright and centered for all po intake  8. NO ice cream, jello, milkshakes, thin water or ice chips       Time Calculation:       Therapy Charges for Today     Code Description Service Date Service Provider Modifiers Qty    06180108338 HC ST TREATMENT SWALLOW 2 3/10/2020 Nicki Leiva, MS CCC-SLP GN 1    [id number] HC ST TREATMENT SWALLOW 2 3/11/2020 Nicki Leiva, MS CCC-SLP GN 1                 Nicki Leiva MS CCC-SLP  3/11/2020

## 2020-03-11 NOTE — NURSING NOTE
Called report to [facility], and spoke to Polina the nurse receiving her, told social workers and the lead RN and they stated they will call an ambulance. The patient received 1 unit of PRBC and tolerated well.

## 2020-03-11 NOTE — PROGRESS NOTES
Discharge Planning Assessment   Antony     Patient Name: Bere Caal  MRN: [id number]  Today's Date: 3/11/2020    Admit Date: 3/5/2020    Discharge Plan     Row Name 03/11/20 1726       Plan    Final Note SS contacted [facility] per Merari. No other needs identified.      Destination - Selection Complete      Service Provider Request Status Selected Services Address Phone Number Fax Number    [facility] Selected Skilled [address] [address] [phone]     JOHANNY Mayes

## 2020-03-11 NOTE — DISCHARGE PLACEMENT REQUEST
"Melissa Caal (64 y.o. Female)     Date of Birth Social Security Number Address Home Phone MRN    1955  [id number]  [facility] [id number] [phone] [id number]    [employer] Mar[employer] Status          Unknown Single       Admission Date Admission Type Admitting Provider Attending Provider Department, Room/Bed    3/5/20 Emergency Sunil Gibson MD Perkins, Jimmye S, MD [address], [address]/    Discharge Date Discharge Disposition Discharge Destination         Skilled Nursing Facility (DC - External)              Attending Provider:  True Arevalo MD    Allergies:  No Known Allergies    Isolation:  None   Infection:  None   Code Status:  CPR    Ht:  162.6 cm (64\")   Wt:  68.5 kg (151 lb 1.6 oz)    Admission Cmt:  None   Principal Problem:  Closed displaced subtrochanteric fracture of left femur (CMS/[facility]) [S72.22XA]                 Active Insurance as of 3/5/2020     Primary Coverage     Payor Plan Insurance Group Employer/Plan Group    MEDICARE MEDICARE A & B      Payor Plan Address Payor Plan Phone Number Payor Plan Fax Number Effective Dates    [address] [phone]  6/1/1980 - None Entered    [facility] [id number]       Subscriber Name Subscriber Birth Date Member ID       MELISSA CAAL 1955 [id number]           Secondary Coverage     Payor Plan Insurance Group Employer/Plan Group    [place]      Payor Plan Address Payor Plan Phone Number Payor Plan Fax Number Effective Dates    [address] [phone]  5/3/2016 - None Entered    [place] [address]       Subscriber Name Subscriber Birth Date Member ID       MELISSA CAAL 1955 [id number]                 Emergency Contacts      (Rel.) Home Phone Work Phone Mobile Phone    [employer] (Guardian) [phone] -- --    Eli Kaiser (Sister) [phone] -- --               History & Physical      Gabriella Dela Cruz MD at 03/05/20 0707            [facility] " "Hospital Medicine Services  History & Physical          Patient Identification:  Name:  Bere Caal  Age:  64 y.o.  Sex:  female  :  1955  MRN:  [id number]   Visit Number:  [id number]  Primary Care Physician:  Kris Garcia MD    I have seen the patient in conjunction with MARY Reyna and I agree with the following statements:     Subjective     Chief complaint: sent from nursing home for \"snap\" heard at leg    History of presenting illness:    Mrs. Caal is a 64 year old female patient who presented to [facility] ED from a local nursing home. The ED reported that she was sent after being transferred in bed and the staff thought they heard a snap and had concern that her leg was injured. She is non-verbal, she is awake and alert, she has garbled-like speech she does squeeze with her right hand, her left hand is contracted.  Due to her mental status she is unable to give a review of systems or HPI or medical history.  Did call [facility] and spoke with Gianna Logan,she states this is more normal baseline with garbled speech, she is a one-to-one feeding she is on modified diet with puréed and nectar thick liquids, she is bedfast.  She did have a PEG tube which was pulled on 2019 due to continuous leaking.    Her work up in the ED showed a negative troponin T, glucose 115, sodium 137, potassium 4.1, bicarb 25.0, anion gap 12, BUN 13, creatinine 0.43, calcium 8.7, alkaline phosphatase 248, ALT 47, , total bilirubin 0.6, albumin 2.0, phosphorus 2.8, TSH 2.150, CRP 7.46, lactate 1.2, magnesium 1.6, WBC 4.32, H&H 9.7 and 30.9, platelet count 153.  UA did show positive nitrites, moderate leukocytes, 6-12 RBC, 31-50 WBC, 4+ bacteria, 7-12 squamous epithelial cells.  CT of the abdomen pelvis without contrast showed negative CT of the pelvis no fracture or hip dislocation per radiology reading.  CT of the lower extremity bilaterally without contrast showed acute displaced " oblique distal left femur fracture, mild hemorrhage in the surrounding muscle, right femur is normal per radiology reading.    Her past medical history is significant for hemiplegia and hemiparesis, aphasia, dysphasia, seizures,  hypertension, traumatic intracerebral hemorrhage, chronic atrial fibrillation she is a leyva of the [facility], Randi Chavis is her . Past surgical history is unknown at this time.         ---------------------------------------------------------------------------------------------------------------------   Review of Systems   Reason unable to perform ROS: unable to obtain due to mental status.       ---------------------------------------------------------------------------------------------------------------------   Past Medical History:   Diagnosis Date   • Aphasia    • Atrial fibrillation (CMS/HCC)    • Dysphagia    • Hemiplegia (CMS/HCC)    • Hypertension    • Seizures (CMS/HCC)    • Traumatic intracerebral hemorrhage (CMS/HCC)      History reviewed. No pertinent surgical history.  History reviewed. No pertinent family history.  Social History     Socioeconomic History   • Marital status: Single     Spouse name: Not on file   • Number of children: Not on file   • Years of education: Not on file   • Highest education level: Not on file   Tobacco Use   • Smoking status: Unknown If Ever Smoked   • Smokeless tobacco: Never Used   Substance and Sexual Activity   • Alcohol use: Not Currently     Comment: unknown, current nursing home resident    • Drug use: Not Currently     Comment: unknown    • Sexual activity: Not Currently     ---------------------------------------------------------------------------------------------------------------------   Allergies:  Patient has no known allergies.  ---------------------------------------------------------------------------------------------------------------------     Medications below are reported home medications pulling from within the  system; at this time, these medications have not been reconciled unless otherwise specified and are in the verification process for further verifcation as current home medications.    Prior to Admission Medications     None        Hospital Scheduled Meds:    [START ON 3/6/2020] cefTRIAXone 1 g Intravenous Q24H   folic acid 1 mg Oral Daily   lactobacillus acidophilus 1 capsule Oral Daily   levETIRAcetam 250 mg Intravenous Q12H   metoprolol tartrate 25 mg Oral Q12H   [START ON 3/6/2020] PARoxetine 10 mg Oral QAM   primidone 250 mg Oral TID   sodium chloride 10 mL Intravenous Q12H   traZODone 50 mg Oral Nightly       dextrose 5 % and sodium chloride 0.9 % 100 mL/hr Last Rate: 100 mL/hr (03/05/20 1740)     ---------------------------------------------------------------------------------------------------------------------   Objective       Vital Signs:  Temp:  [99.4 °F (37.4 °C)-100.6 °F (38.1 °C)] 99.4 °F (37.4 °C)  Heart Rate:  [] 98  Resp:  [20] 20  BP: ()/(58-80) 112/66      03/05/20  0047 03/05/20  0655   Weight: 68.8 kg (151 lb 10.9 oz) 65.5 kg (144 lb 7 oz)     Body mass index is 24.79 kg/m².  ---------------------------------------------------------------------------------------------------------------------       Physical Exam  Constitutional: awake and alert female in no distress .     HENT:  Head: Normocephalic and atraumatic.  Mouth:  Moist mucous membranes.    Eyes:  Pupils are equal, round, and reactive to light.  No scleral icterus.  Neck:  Neck supple.  No JVD present.    Cardiovascular: tachycardic, irregularly irregular rhythm.  with no murmur.  Pulmonary/Chest:  No respiratory distress, no wheezes, no crackles, with normal breath sounds and good air movement.  Abdominal:  Soft.  Bowel sounds are present.  No distension and no tenderness. Previous G-Tube site present, appears excoriated  Musculoskeletal:  Bilateral upper contractures, left worse than right, left hand also contracted, right  hand opens and she is able to slightly squeeze my hand, knees also appear contracted bilaterally, peripheral pulses intact.  Neurological:  Alert, attempts to garble speech when spoken too, followed my command to squeeze my hand, the nursing home reports her speech is always like this. Nonverbal  Skin:  Skin is warm and dry.  No rash noted.  No pallor.   Psychiatric: Behavior is normal.    ---------------------------------------------------------------------------------------------------------------------  EKG:          ---------------------------------------------------------------------------------------------------------------------   Results from last 7 days   Lab Units 03/05/20 0926 03/05/20 0429   CRP mg/dL  --  7.46*   LACTATE mmol/L  --  1.2   WBC 10*3/mm3  --  4.32   HEMOGLOBIN g/dL 10.4* 9.7*   HEMATOCRIT % 33.0* 30.9*   MCV fL  --  84.4   MCHC g/dL  --  31.4*   PLATELETS 10*3/mm3  --  153   INR  1.12*  --          Results from last 7 days   Lab Units 03/05/20 0429   SODIUM mmol/L 137   POTASSIUM mmol/L 4.1   MAGNESIUM mg/dL 1.6   CHLORIDE mmol/L 100   CO2 mmol/L 25.0   BUN mg/dL 13   CREATININE mg/dL 0.43*   EGFR IF NONAFRICN AM mL/min/1.73 149   CALCIUM mg/dL 8.7   GLUCOSE mg/dL 115*   ALBUMIN g/dL 2.80*   BILIRUBIN mg/dL 0.6   ALK PHOS U/L 248*   AST (SGOT) U/L 180*   ALT (SGPT) U/L 47*   Estimated Creatinine Clearance: 140.3 mL/min (A) (by C-G formula based on SCr of 0.43 mg/dL (L)).  Ammonia   Date Value Ref Range Status   03/05/2020 75 (H) 11 - 51 umol/L Final     Results from last 7 days   Lab Units 03/05/20  0429   CK TOTAL U/L 166   TROPONIN T ng/mL <0.010         No results found for: HGBA1C  Lab Results   Component Value Date    TSH 2.150 03/05/2020    FREET4 1.56 03/05/2020     No results found for: PREGTESTUR, PREGSERUM, HCG, HCGQUANT  Pain Management Panel     There is no flowsheet data to display.                 ---------------------------------------------------------------------------------------------------------------------  Imaging Results (Last 7 Days)     Procedure Component Value Units Date/Time    CT Head Without Contrast [[id number]] Collected:  03/05/20 1050     Updated:  03/05/20 1053    Narrative:          EXAMINATION: CT HEAD WO CONTRAST-      CLINICAL INDICATION:     AMS; S72.22XA-Displaced subtrochanteric  fracture of left femur, initial encounter for closed fracture;  N30.00-Acute cystitis without hematuria     TECHNIQUE: Contiguous axial CT images of the head were obtained without  contrast administration.      Radiation dose reduction techniques were utilized per ALARA protocol.  Automated exposure control was initiated through either or [employer] or  [employer] software packages by  protocol.       1094.37 mGy.cm     COMPARISON:  None.       FINDINGS: Generalized cerebral atrophy is present. There is no mass  effect, midline displacement, or hydrocephalus. Right periventricular  white matter change greater than left suggestive of chronic small vessel  ischemic change. There is no CT evidence of acute infarct or hemorrhage.  Bone windows reveal no osseous abnormalities or fractures.        Impression:       Right periventricular white matter change greater than left  suggestive of chronic small vessel ischemic change.     This report was finalized on 3/5/2020 10:51 AM by Dr. Mario Brasher MD.       CT Lower Extremity Bilateral Without Contrast [[id number]] Collected:  03/05/20 0245     Updated:  03/05/20 0247    Narrative:       CT LE BILAT WO    INDICATION:    Pain and left leg after injury tonight at nursing home    TECHNIQUE:   CT of the lower extremities without IV contrast. Coronal and sagittal reconstructions were obtained.  Radiation dose reduction techniques included automated exposure control or exposure modulation based on body size. Count of known CT and cardiac nuc med  studies  performed in previous 12 months: 0.      COMPARISON:    None available.    FINDINGS:  There is an oblique markedly displaced fractures of the distal third of the left femoral shaft. Mild hemorrhage in the surrounding muscle. The right femur is normal.      Impression:       Acute displaced oblique distal left femur fracture    Signer Name: Arturo Reyes MD   Signed: 3/5/2020 2:45 AM   Workstation Name: [facility]    Radiology [facility]    CT Pelvis Without Contrast [[id number]] Collected:  03/05/20 0244     Updated:  03/05/20 0246    Narrative:       CT Pelvis WO    INDICATION:   Patient suffered injury while having close change at nursing home tonight. Pain in left leg    TECHNIQUE:   CT of the pelvis without IV contrast. Coronal and sagittal reconstructions were obtained.  Radiation dose reduction techniques included automated exposure control or exposure modulation based on body size. Count of known CT and cardiac nuc med studies  performed in previous 12 months: 0.     COMPARISON:   None available.    FINDINGS:  Patient could only lie on the left side this examination the patient's hips are flexed. No fracture or dislocation is visible. Visualized bowel is normal. Bladder is normal.      Impression:       Negative CT of the pelvis. No fracture or hip dislocation is visible.          Signer Name: Arturo Reyes MD   Signed: 3/5/2020 2:44 AM   Workstation Name: [facility]          Cultures: blood and urine cultures collected in the ED    ---------------------------------------------------------------------------------------------------------------------  Assessment / Plan       Assessment and Plan:    Displaced oblique distal left femur fracture, with mild hemorrhage in surrounding muscle: NPO, orthopedic surgery consulted. Neurovascular checks every 4 hours. PRN Pain medication. INR ordered. Hold eliquis.     Sepsis with UTI (, temp 100.1, CRP 7.46): rocephin  2gm IV was started in the ED. Continue with rocephin daily. Urine and blood culture collected. Repeat labs in the am. Start on lactobacillus. Will get xray chest and add flagyl since risk of aspiration is high.     Hyperammonemia. Will do a dose of lactulose rectal enema once. Liver enzymes elevated. Will get US liver.     Normocytic anemia: H/H 9.7/30.9, baseline unknown no previous labs available, repeat H/H stable. Monitor.      Elevated Transaminases and alkaline phosphatase: ALT 47, , Alk phosph 248, no previous labs available. Acute Hepatitis panel ordered, total bilirubin is 0.6. No Statins on med list from nursing home, there is Tylenol PRN, level ordered.  Hold tylenol.     Atrial Fibrillation, chronicity unknown, with RVR: the ED was unable to get EKG, I have ordered one at this time. She is on elqiuis for stroke prevention, last dose per the nursing home staff was 3/4/2020 at 2000. Start on lopressor for rate control when available by pharmacy. Spoke with Dr. Dela Cruz, Dig ordered, monitor response. Hold coreg and eliquis.     Hemiplegia and hemiparesis, hx of nontraumatic intracerebral hemorrhage, non verbal and bed ridden: turn q2hr, wound care for any skin breakdown. Supportive care. CT head with chronic changes.     Essential hypertension: Hold home coreg since BP borderline. On lopressor.      Dysphasia: utilizes modified diet at the nursing home, puree with nectar thick liquids, have SLP evaluate after surgery. On D5W and continue with accuchecks.    Hx of Seizures: continue keppra iv, and primidone seizure precautions. Nursing home staff I spoke with states they are unaware of any recent seizures.     Activity: bedrest, turn q2hr   Tubes/Lines/Drains: hutton cath  Diet: NPO, gentle IV fluids for hydration, accu checks   DVT prophylaxis: SCD to right leg  GI prophylaxis: continue PPI from home     Code status: Full Code    Patient is high risk for the following reasons: UTI, Femur Fracture, IV  pain medication    Gabriella Dela Cruz MD  03/05/20  7:38 PM  ---------------------------------------------------------------------------------------------------------------------   Patient seen and examined independently.  I agree with assessment plan and history and physical by MARY Grossman.  The note has been edited to reflect my findings.    Gabriella Perez MD   [facility] Medicine    Electronically signed by Gabriella Dela Cruz MD at 03/05/20 1955         Current Facility-Administered Medications   Medication Dose Route Frequency Provider Last Rate Last Dose   • apixaban (ELIQUIS) tablet 5 mg  5 mg Oral Q12H Gabriella Dela Cruz MD   5 mg at 03/11/20 0603   • cefdinir (OMNICEF) capsule 300 mg  300 mg Oral Q12H True Arevalo MD   300 mg at 03/11/20 0823   • dextrose (D50W) 25 g/ 50mL Intravenous Solution 25 g  25 g Intravenous Q15 Min PRN Modesto Dumont MD       • dextrose (GLUTOSE) oral gel 15 g  15 g Oral Q15 Min PRN Modesto Dumont MD       • digoxin (LANOXIN) injection 125 mcg  125 mcg Intravenous Daily Gabriella Dela Cruz MD   125 mcg at 03/11/20 1138   • famotidine (PEPCID) tablet 20 mg  20 mg Oral BID AC Gabriella Dela Cruz MD   20 mg at 03/11/20 0823   • ferric gluconate (FERRLECIT) 125 mg in sodium chloride 0.9 % 110 mL IVPB  125 mg Intravenous Daily Gabriella Dela Cruz  mL/hr at 03/11/20 0824 125 mg at 03/11/20 0824   • folic acid (FOLVITE) tablet 1 mg  1 mg Oral Daily Modesto Dumont MD   1 mg at 03/11/20 0823   • furosemide (LASIX) tablet 40 mg  40 mg Oral Daily True Arevalo MD   40 mg at 03/10/20 2326   • glucagon (human recombinant) (GLUCAGEN DIAGNOSTIC) injection 1 mg  1 mg Subcutaneous Q15 Min PRN Modesto Dumont MD       • hyoscyamine (LEVSIN) SL tablet 125 mcg  125 mcg Sublingual Nightly Modesto Dumont MD   125 mcg at 03/10/20 2212   • ipratropium-albuterol (DUO-NEB) nebulizer solution 3 mL  3 mL Nebulization Q6H PRN Gabriella Dela Cruz MD       • [START  ON 3/13/2020] iron polysaccharides (NIFEREX) capsule 150 mg  150 mg Oral Daily Gabriella Dela Cruz MD       • lactobacillus acidophilus (RISAQUAD) capsule 1 capsule  1 capsule Oral Daily Modesto Dumont MD   1 capsule at 03/11/20 0823   • lactulose (CHRONULAC) 10 GM/15ML solution 20 g  20 g Oral BID Gabriella Dela Cruz MD   20 g at 03/10/20 2245   • levETIRAcetam (KEPPRA) tablet 250 mg  250 mg Oral Q12H Gabriella Dela Cruz MD   250 mg at 03/11/20 0603   • Magnesium Sulfate 2 gram Bolus, followed by 8 gram infusion (total Mg dose 10 grams)- Mg less than or equal to 1mg/dL  2 g Intravenous PRN True Arevalo MD        Or   • Magnesium Sulfate 2 gram / 50mL Infusion (GIVE X 3 BAGS TO EQUAL 6GM TOTAL DOSE) - Mg 1.1 - 1.5 mg/dl  2 g Intravenous PRN True Arevalo MD        Or   • Magnesium Sulfate 4 gram infusion- Mg 1.6-1.9 mg/dL  4 g Intravenous PRN True Arevalo MD       • metoprolol tartrate (LOPRESSOR) tablet 25 mg  25 mg Oral Q12H Modesto Dumont MD   25 mg at 03/09/20 0802   • metroNIDAZOLE (FLAGYL) tablet 500 mg  500 mg Oral Q8H True Arevalo MD   500 mg at 03/11/20 0603   • morphine injection 1 mg  1 mg Intravenous Q4H PRN Modesto Dumont MD   1 mg at 03/09/20 0428    And   • naloxone (NARCAN) injection 0.4 mg  0.4 mg Intravenous Q5 Min PRN Modesto Dumont MD       • nitroglycerin (NITROSTAT) SL tablet 0.4 mg  0.4 mg Sublingual Q5 Min PRN Modesto Dumont MD       • PARoxetine (PAXIL) tablet 10 mg  10 mg Oral QAM Modesto Dumont MD   10 mg at 03/11/20 0602   • potassium chloride (K-DUR,KLOR-CON) CR tablet 40 mEq  40 mEq Oral PRN True Arevalo MD        Or   • potassium chloride (KLOR-CON) packet 40 mEq  40 mEq Oral PRN True Arevalo MD        Or   • potassium chloride 10 mEq in 100 mL IVPB  10 mEq Intravenous Q1H PRN True Arevalo MD       • potassium phosphate 45 mmol in sodium chloride 0.9 % 500 mL infusion  45 mmol Intravenous PRN True Arevalo MD         Or   • potassium phosphate 30 mmol in sodium chloride 0.9 % 250 mL infusion  30 mmol Intravenous PRN True Arevalo MD        Or   • potassium phosphate 15 mmol in sodium chloride 0.9 % 100 mL infusion  15 mmol Intravenous PRN True Arevalo MD        Or   • sodium phosphates 45 mmol in sodium chloride 0.9 % 500 mL IVPB  45 mmol Intravenous PRN True Arevalo MD        Or   • sodium phosphates 30 mmol in sodium chloride 0.9 % 250 mL IVPB  30 mmol Intravenous PRN True Arevalo MD        Or   • sodium phosphates 15 mmol in sodium chloride 0.9 % 250 mL IVPB  15 mmol Intravenous PRN True Arevalo MD       • primidone (MYSOLINE) tablet 250 mg  250 mg Oral TID Modesto Dumont MD   250 mg at 03/11/20 0823   • sennosides-docusate (PERICOLACE) 8.6-50 MG per tablet 2 tablet  2 tablet Oral BID PRN Modesto Dumont MD       • sodium chloride 0.9 % flush 10 mL  10 mL Intravenous PRN Modesto Dumont MD       • sodium chloride 0.9 % flush 10 mL  10 mL Intravenous Q12H Modesto Dumont MD   10 mL at 03/11/20 0847   • sodium chloride 0.9 % flush 10 mL  10 mL Intravenous PRN Modesto Dumont MD         ADL Documentation (last day)     Date/Time Presence of Pain Transferring Toileting Bathing Dressing Eating Communication Swallowing    03/11/20 0823  denies pain/discomfort  4 - completely dependent  4 - completely dependent  4 - completely dependent  4 - completely dependent  4 - completely dependent  3 - unable to speak (not related to language barrier)  2 - difficulty swallowing liquids/foods    03/10/20 2213  non-verbal indicators absent  4 - completely dependent  4 - completely dependent  4 - completely dependent  4 - completely dependent  4 - completely dependent  3 - unable to speak (not related to language barrier)  2 - difficulty swallowing liquids/foods    03/10/20 0900  non-verbal indicators absent  4 - completely dependent  4 - completely dependent  4 - completely dependent  4 -  completely dependent  4 - completely dependent  3 - unable to understand or speak (not related to language barrier)  2 - difficulty swallowing liquids/foods                Discharge Summary    No notes of this type exist for this encounter.         Discharge Order (From admission, onward)     Start     Ordered    03/11/20 1107  Discharge patient  Once     Expected Discharge Date:  03/11/20    Discharge Disposition:  Skilled Nursing Facility (DC - External)    Physician of Record for Attribution - Please select from Treatment Team:  GOLD YAO [[id number]]    Review needed by CMO to determine Physician of Record:  No       Question Answer Comment   Physician of Record for Attribution - Please select from Treatment Team GOLD YAO    Review needed by CMO to determine Physician of Record No        03/11/20 1126

## 2020-03-11 NOTE — PROGRESS NOTES
Notified pts state guardian Randi Chavis that pt is being dc back to [facility] today.

## 2020-03-11 NOTE — DISCHARGE PLACEMENT REQUEST
"Melissa Caal (64 y.o. Female)     Date of Birth Social Security Number Address Home Phone MRN    1955  [id number]  [facility] [address] [phone] [id number]    Holiness Marital Status          Unknown Single       Admission Date Admission Type Admitting Provider Attending Provider Department, Room/Bed    3/5/20 Emergency Sunil Gibson MD Perkins, Jimmye S, MD [address], 3335/1P    Discharge Date Discharge Disposition Discharge Destination         Skilled Nursing Facility (DC - External)              Attending Provider:  True Arevalo MD    Allergies:  No Known Allergies    Isolation:  None   Infection:  None   Code Status:  CPR    Ht:  162.6 cm (64\")   Wt:  68.5 kg (151 lb 1.6 oz)    Admission Cmt:  None   Principal Problem:  Closed displaced subtrochanteric fracture of left femur (CMS/[facility]) [S72.22XA]                 Active Insurance as of 3/5/2020     Primary Coverage     Payor Plan Insurance Group Employer/Plan Group    MEDICARE MEDICARE A & B      Payor Plan Address Payor Plan Phone Number Payor Plan Fax Number Effective Dates    [address] [phone]  6/1/1980 - None Entered    [facility] [address]       Subscriber Name Subscriber Birth Date Member ID       MELISSA CAAL 1955 [id number]           Secondary Coverage     Payor Plan Insurance Group Employer/Plan Group    [place]      Payor Plan Address Payor Plan Phone Number Payor Plan Fax Number Effective Dates    [address] [phone]  5/3/2016 - None Entered    [place] [address]       Subscriber Name Subscriber Birth Date Member ID       MELISSA CAAL 1955 [id number]                 Emergency Contacts      (Rel.) Home Phone Work Phone Mobile Phone    [employer] (Guardian) [phone] -- --    Eli Kaiser (Sister) [phone] -- --              Current Facility-Administered Medications   Medication Dose Route Frequency Provider Last Rate " Last Dose   • apixaban (ELIQUIS) tablet 5 mg  5 mg Oral Q12H Gabriella Dela Cruz MD   5 mg at 03/11/20 0603   • cefdinir (OMNICEF) capsule 300 mg  300 mg Oral Q12H True Arevalo MD   300 mg at 03/11/20 0823   • dextrose (D50W) 25 g/ 50mL Intravenous Solution 25 g  25 g Intravenous Q15 Min PRN Modesto Dumont MD       • dextrose (GLUTOSE) oral gel 15 g  15 g Oral Q15 Min PRN Modesto Dumont MD       • digoxin (LANOXIN) injection 125 mcg  125 mcg Intravenous Daily Gabriella Dela Cruz MD   125 mcg at 03/10/20 1107   • famotidine (PEPCID) tablet 20 mg  20 mg Oral BID AC Gabriella Dela Cruz MD   20 mg at 03/11/20 0823   • ferric gluconate (FERRLECIT) 125 mg in sodium chloride 0.9 % 110 mL IVPB  125 mg Intravenous Daily Gabriella Dela Cruz  mL/hr at 03/11/20 0824 125 mg at 03/11/20 0824   • folic acid (FOLVITE) tablet 1 mg  1 mg Oral Daily Modesto Dumont MD   1 mg at 03/11/20 0823   • furosemide (LASIX) tablet 40 mg  40 mg Oral Daily True Arevalo MD   40 mg at 03/10/20 2326   • glucagon (human recombinant) (GLUCAGEN DIAGNOSTIC) injection 1 mg  1 mg Subcutaneous Q15 Min PRN Modesto Dumont MD       • hyoscyamine (LEVSIN) SL tablet 125 mcg  125 mcg Sublingual Nightly Modesto Dumont MD   125 mcg at 03/10/20 2212   • ipratropium-albuterol (DUO-NEB) nebulizer solution 3 mL  3 mL Nebulization Q6H PRN Gabriella Dela Cruz MD       • [START ON 3/13/2020] iron polysaccharides (NIFEREX) capsule 150 mg  150 mg Oral Daily Gabriella Dela Cruz MD       • lactobacillus acidophilus (RISAQUAD) capsule 1 capsule  1 capsule Oral Daily Modesto Dumont MD   1 capsule at 03/11/20 0823   • lactulose (CHRONULAC) 10 GM/15ML solution 20 g  20 g Oral BID Gabriella Dela Cruz MD   20 g at 03/10/20 2245   • levETIRAcetam (KEPPRA) tablet 250 mg  250 mg Oral Q12H Gabriella Dela Cruz MD   250 mg at 03/11/20 0603   • Magnesium Sulfate 2 gram Bolus, followed by 8 gram infusion (total Mg dose 10 grams)- Mg less than  or equal to 1mg/dL  2 g Intravenous PRN True Arevalo MD        Or   • Magnesium Sulfate 2 gram / 50mL Infusion (GIVE X 3 BAGS TO EQUAL 6GM TOTAL DOSE) - Mg 1.1 - 1.5 mg/dl  2 g Intravenous PRN True Arevalo MD        Or   • Magnesium Sulfate 4 gram infusion- Mg 1.6-1.9 mg/dL  4 g Intravenous PRN True Arevalo MD       • metoprolol tartrate (LOPRESSOR) tablet 25 mg  25 mg Oral Q12H Modesto Dumont MD   25 mg at 03/09/20 0802   • metroNIDAZOLE (FLAGYL) tablet 500 mg  500 mg Oral Q8H True Arevalo MD   500 mg at 03/11/20 0603   • morphine injection 1 mg  1 mg Intravenous Q4H PRN Modesto Dumont MD   1 mg at 03/09/20 0428    And   • naloxone (NARCAN) injection 0.4 mg  0.4 mg Intravenous Q5 Min PRN Modesto Dumont MD       • nitroglycerin (NITROSTAT) SL tablet 0.4 mg  0.4 mg Sublingual Q5 Min PRN Modesto Dumont MD       • PARoxetine (PAXIL) tablet 10 mg  10 mg Oral QAM Modesto Dumont MD   10 mg at 03/11/20 0602   • potassium chloride (K-DUR,KLOR-CON) CR tablet 40 mEq  40 mEq Oral PRN True Arevalo MD        Or   • potassium chloride (KLOR-CON) packet 40 mEq  40 mEq Oral PRN True Arevalo MD        Or   • potassium chloride 10 mEq in 100 mL IVPB  10 mEq Intravenous Q1H PRN True Arevalo MD       • potassium phosphate 45 mmol in sodium chloride 0.9 % 500 mL infusion  45 mmol Intravenous PRN True Arevalo MD        Or   • potassium phosphate 30 mmol in sodium chloride 0.9 % 250 mL infusion  30 mmol Intravenous PRN True Arevalo MD        Or   • potassium phosphate 15 mmol in sodium chloride 0.9 % 100 mL infusion  15 mmol Intravenous PRN True Arevalo MD        Or   • sodium phosphates 45 mmol in sodium chloride 0.9 % 500 mL IVPB  45 mmol Intravenous PRN True Arevalo MD        Or   • sodium phosphates 30 mmol in sodium chloride 0.9 % 250 mL IVPB  30 mmol Intravenous PRN True Arevalo MD        Or   • sodium phosphates 15 mmol in sodium  chloride 0.9 % 250 mL IVPB  15 mmol Intravenous PRN True Arevalo MD       • primidone (MYSOLINE) tablet 250 mg  250 mg Oral TID Modesto Dumont MD   250 mg at 03/11/20 0823   • sennosides-docusate (PERICOLACE) 8.6-50 MG per tablet 2 tablet  2 tablet Oral BID PRN Modesto Dumont MD       • sodium chloride 0.9 % flush 10 mL  10 mL Intravenous PRN Modesto Dumont MD       • sodium chloride 0.9 % flush 10 mL  10 mL Intravenous Q12H Modesto Dumont MD   10 mL at 03/11/20 0847   • sodium chloride 0.9 % flush 10 mL  10 mL Intravenous PRN Modesto Dumont MD           Discharge Summary    No notes of this type exist for this encounter.         Discharge Order (From admission, onward)     Start     Ordered    03/11/20 1107  Discharge patient  Once     Expected Discharge Date:  03/11/20    Discharge Disposition:  Skilled Nursing Facility (DC - External)    Physician of Record for Attribution - Please select from Treatment Team:  GOLD YAO [[id number]]    Review needed by CMO to determine Physician of Record:  No       Question Answer Comment   Physician of Record for Attribution - Please select from Treatment Team GOLD YAO    Review needed by CMO to determine Physician of Record No        03/11/20 1126

## 2020-03-11 NOTE — DISCHARGE SUMMARY
[facility] HOSPITALISTS DISCHARGE SUMMARY    Patient Identification:  Name:  Bere Caal  Age:  64 y.o.  Sex:  female  :  1955  MRN:  [id number]  Visit Number:  [id number]    Date of Admission:  3/5/2020  Date of Discharge:  3/11/2020    PCP: Kris Garcia MD    DISCHARGE DIAGNOSIS  -Displaced oblique distal left femur fracture, with mild hemorrhage in surrounding muscle  -Sepsis that was present on admission (, temp 100.1, CRP 7.46) due to an E. coli UTI urinary tract infection  -Hyperammonemia and elevated transaminases and alkaline phosphatase, which have improved, suspect due to inactive chronic hepatitis B  -Mental handicap and nonverbal  -Atrial fibrillation with RVR  -Hemiplegia and hemiparesis, history of nontraumatic intracerebral hemorrhage  -Dysphagia  -History of seizures  -Acute hypokalemia and acute hypomagnesemia and acute hypophosphatemia  -Left atrial cavity size is moderate-to-severely dilated  -Prolonged QTc of 489 ms  -Normocytic anemia, some of which is due to acute blood loss anemia (no transfusions yet this admission)  -Mild mitral valve regurgitation and mild tricuspid regurgitation  -Left atrial cavity size is moderate-to-severely dilated    CONSULTS   Dr. Dumont with orthopedic surgery    PROCEDURES PERFORMED  3/7/2020:  Repair of the left distal femur fracture with a retrograde femoral nail from [employer]   3/11/2020:  Transfusion of one unit of PRBC    HOSPITAL COURSE  Patient is a 64 y.o. female presented to [facility] ED on 3/5/2020 after being sent by the nursing home for a snap heard during a bed transfer; imaging showed a fracture of the distal left femur.  She was thus admitted to the med surg floor for further evaluation and treatment.  Please see the admitting history and physical for further details.  Orthopedic surgery was consulted and she had repair on hospital day 2.  She was given a few doses of morphine for post operative pain.   She did well with the surgery; the splint became soiled and was changed with orthopedics help on the day of discharge.      After surgery, she was found to be in AFib with RVR; she was started on oral metoprolol and digoxin; she was given the digoxin scheduled while hospitalized but not the metoprolol.  However, she did receive the scheduled IV digoxin.  She has since been changed to oral digoxin as the same dose; however, this is a little less than the recommended dose as this is not a 1:1 conversion.  Thus, since the heart rates are  on the day of discharge, recommend giving the metoprolol scheduled with the digoxin and obtaining a digoxin level in the next 5 days to make sure that the level is not too high.    Also, she was found to have a UTI and was started on high dose rocephin; E coli grew on the urine culture.  She will finish her treatment with oral Omnicef.    She had a NGT placed during her hospitalization for nutrition but after she passed the swallowing evaluation.     On the day of discharge, she was at her baseline neurological status.  She was tolerating a modified diet.          VITAL SIGNS:  Temp:  [98.1 °F (36.7 °C)-99 °F (37.2 °C)] 98.3 °F (36.8 °C)  Heart Rate:  [] 103  Resp:  [16-19] 18  BP: ()/(64-67) 101/64  SpO2:  [96 %-97 %] 96 %  Device (Oxygen Therapy): room air    Body mass index is 25.94 kg/m².  Wt Readings from Last 3 Encounters:   03/06/20 68.5 kg (151 lb 1.6 oz)       PHYSICAL EXAM:  Physical Exam   Constitutional: She appears well-developed. No distress.   HENT:   Head: Normocephalic and atraumatic.   Nose: Nose normal.   Eyes: Pupils are equal, round, and reactive to light. Conjunctivae and EOM are normal. No scleral icterus.   Cardiovascular: An irregularly irregular rhythm present. Exam reveals no decreased pulses.   No murmur heard.  Pulmonary/Chest: Effort normal and breath sounds normal. No accessory muscle usage. No respiratory distress. She has no wheezes.  She has no rales.   Abdominal: Soft. She exhibits no distension.   Musculoskeletal: She exhibits no edema or deformity.   Flaccid left arm with some muscle wasting; also has muscle wasting of the bilateral legs.  No red or swollen knees, wrists, ankles, or elbows bilaterally.   Neurological: She is alert.   Cannot follow commands.  Does not answer questions.  Not talking to me.  Does not shake her head to yes and no questions.  Flaccid left arm but has been seen moving the left leg some.  Left facial droop.   Skin: Capillary refill takes less than 2 seconds. She is not diaphoretic. No erythema. No pallor.   Psychiatric: She is withdrawn. She is noncommunicative. She is inattentive.        DISCHARGE DISPOSITION   Stable      DISCHARGE MEDICATIONS:          Diet Instructions     Diet: Consistent Carbohydrate, Dysphagia; Nectar / Syrup Thick Liquids; Pureed      Discharge Diet:   Consistent Carbohydrate, Dysphagia       Fluid Consistency:  Nectar / Syrup Thick Liquids    Pureed Options:  Pureed        Activity Instructions     Activity as Tolerated      Additional Activity Instructions:      Continue splint left femur fracture until follow up appointment with Dr. Dumont.                Additional Instructions for the Follow-ups that You Need to Schedule     Discharge Follow-up with PCP   As directed     Currently Documented PCP:    Kris Garcia MD    PCP Phone Number:    [phone]     Follow Up Details:  2-7 days or per the nursing home schedule             Discharge Follow-up with Specified Provider: Cardiology; 1 Month   As directed    To:  Cardiology    Follow Up:  1 Month                TEST  RESULTS PENDING AT DISCHARGE:   Order Current Status    Hepatitis B DNA, Quantitative, PCR In process    Hepatitis B E Antibody In process    Type & Screen In process           CODE STATUS  Code Status and Medical Interventions:   Ordered at: 03/05/20 0611     Level Of Support Discussed With:    Health Care  Surrogate     Code Status:    CPR     Medical Interventions (Level of Support Prior to Arrest):    Full       True Arevalo MD  03/11/20  11:26    Please note that this discharge summary required more than 30 minutes to complete.    Please send a copy of this dictation to the following providers:  Kris Garcia MD

## 2020-03-12 ENCOUNTER — PATIENT OUTREACH (OUTPATIENT)
Dept: CASE MANAGEMENT | Facility: OTHER | Age: 65
End: 2020-03-12

## 2020-03-12 LAB
ABO + RH BLD: NORMAL
BH BB BLOOD EXPIRATION DATE: NORMAL
BH BB BLOOD TYPE BARCODE: 5100
BH BB DISPENSE STATUS: NORMAL
BH BB PRODUCT CODE: NORMAL
BH BB UNIT NUMBER: NORMAL
UNIT  ABO: NORMAL
UNIT  RH: NORMAL

## 2020-03-12 NOTE — OUTREACH NOTE
SNF Follow-up Note      Responses   Acute Facility Discharged From  [facility]   Acute Discharge Date  03/11/20   Name of the Skilled Nursing Facility?  [facility]   Tier Level of the Skilled Nursing Facility  2   Purpose of SNF Admission  PT, OT   Estimated length of stay for the patient?  TBD   Who is the insurance provider or payor of patient stay?  Medicare   Progression of Patient?  RNMAHOGANY spoke with Enedelia at [facility], pt covered under Med A for skilled services.          Maria Rasihd RN  Ambulatory     3/12/2020, 09:15

## 2020-03-13 ENCOUNTER — TRANSCRIBE ORDERS (OUTPATIENT)
Dept: ADMINISTRATIVE | Facility: HOSPITAL | Age: 65
End: 2020-03-13

## 2020-03-13 ENCOUNTER — LAB (OUTPATIENT)
Dept: LAB | Facility: HOSPITAL | Age: 65
End: 2020-03-13

## 2020-03-13 DIAGNOSIS — A04.72 C. DIFFICILE COLITIS: ICD-10-CM

## 2020-03-13 DIAGNOSIS — A04.72 C. DIFFICILE COLITIS: Primary | ICD-10-CM

## 2020-03-13 LAB
027 TOXIN: NORMAL
C DIFF TOX GENS STL QL NAA+PROBE: NEGATIVE

## 2020-03-13 PROCEDURE — 87493 C DIFF AMPLIFIED PROBE: CPT

## 2020-03-19 ENCOUNTER — PATIENT OUTREACH (OUTPATIENT)
Dept: CASE MANAGEMENT | Facility: OTHER | Age: 65
End: 2020-03-19

## 2020-03-19 NOTE — OUTREACH NOTE
Care Coordination Note    RN-JAE spoke with Enedelia at [facility], patient is still covered under Med A for skilled services.    Maria Rashid RN  Ambulatory     3/19/2020, 13:49

## 2020-03-26 ENCOUNTER — PATIENT OUTREACH (OUTPATIENT)
Dept: CASE MANAGEMENT | Facility: OTHER | Age: 65
End: 2020-03-26

## 2020-03-26 NOTE — OUTREACH NOTE
Care Coordination Note    RN-JAE spoke with Enedelia at [facility]; patient is still covered by Medicare A for skilled services.    Maria Rashid RN  Ambulatory     3/26/2020, 10:56

## 2020-04-02 ENCOUNTER — LAB REQUISITION (OUTPATIENT)
Dept: LAB | Facility: HOSPITAL | Age: 65
End: 2020-04-02

## 2020-04-02 ENCOUNTER — PATIENT OUTREACH (OUTPATIENT)
Dept: CASE MANAGEMENT | Facility: OTHER | Age: 65
End: 2020-04-02

## 2020-04-02 DIAGNOSIS — R94.5 ABNORMAL RESULTS OF LIVER FUNCTION STUDIES: ICD-10-CM

## 2020-04-02 LAB — AMMONIA BLD-SCNC: 124 UMOL/L (ref 11–51)

## 2020-04-02 PROCEDURE — 82140 ASSAY OF AMMONIA: CPT | Performed by: FAMILY MEDICINE

## 2020-04-02 NOTE — OUTREACH NOTE
Care Coordination Note    RN-JAE spoke with Enedelia at [facility]; pt still covered by Med a for skilled services.    Maria Rashid RN  Ambulatory     4/2/2020, 14:06

## 2020-04-09 ENCOUNTER — PATIENT OUTREACH (OUTPATIENT)
Dept: CASE MANAGEMENT | Facility: OTHER | Age: 65
End: 2020-04-09

## 2020-04-09 NOTE — OUTREACH NOTE
Care Coordination Note    RN-JAE spoke with Claudia at [facility]; pt still covered by Med A for skilled services.    Maria Rashid RN  Ambulatory     4/9/2020, 14:42

## 2020-04-13 ENCOUNTER — TELEPHONE (OUTPATIENT)
Dept: GASTROENTEROLOGY | Facility: CLINIC | Age: 65
End: 2020-04-13

## 2020-04-13 NOTE — TELEPHONE ENCOUNTER
----- Message from MARY London sent at 4/10/2020 10:46 AM EDT -----  Hello!    Bere Caal is a nursing home patient here in [place] that Dr. Garcia sees. She recently was in the hospital in March and it seems she has some chronic hepatitis B. She has a liver ultrasound and labs in her chart. Her liver enzymes were a little elevated on discharge but since discharge she has become progressively more jaundice. Her most recent ammonia level is 124 and AST is 202.7. Dr. Garcia says she is non-communicative and cognitively she has no changes. He has ordered lactulose and IV fluids but they were unable to get IV access to start the fluids. He has asked if I would reach out to GI to see if you would review her chart to see if you have any recommendations for any interventions to help her. Would you like me to put in a referral?    Thank You,   Zenobia HERNANDEZ

## 2020-04-13 NOTE — TELEPHONE ENCOUNTER
I spoke with MARY Saldaña today after reviewing patient's chart.  Based on review, it appears that patient has an acute elevation of the bilirubin and AST along with elevated alkaline phosphatase.  It seems that patient could have a biliary obstruction.  I did not see a cholecystectomy in the past medical history.  Also, would consider medication induced liver enzyme elevation. Does not seem related to chronic Hep B unless she has had a medicine recently that might reactivate it (but still ALT is normal). I would recommend an ultrasound of the right upper quadrant including liver, gallbladder and biliary tree.  Also recommend hepatic function every couple of days and would also consider adding on an INR.  Might consider sending patient to ED if her condition worsens.    Labs 4/6/2020: Total bilirubin 10.66, direct bilirubin 8.15, .7, ALT 44.2, alkaline phosphatase 357, creatinine normal, white blood count normal, hemoglobin 11.6, platelets 162,000.     Labs 3/31/2020: Total bilirubin 8.24, , ALT 40.1.     Labs 3/11/2020: Total bilirubin 1.0, AST 55, ALT 16, alkaline phosphatase 175.     Labs 3/5/2020 showed positive hepatitis B surface antigen.    Ultrasound liver completed 3/6/2020 normal.

## 2020-04-14 ENCOUNTER — LAB REQUISITION (OUTPATIENT)
Dept: LAB | Facility: HOSPITAL | Age: 65
End: 2020-04-14

## 2020-04-14 DIAGNOSIS — I10 ESSENTIAL (PRIMARY) HYPERTENSION: ICD-10-CM

## 2020-04-14 LAB — AMMONIA BLD-SCNC: 93 UMOL/L (ref 11–51)

## 2020-04-14 PROCEDURE — 82140 ASSAY OF AMMONIA: CPT | Performed by: FAMILY MEDICINE

## 2020-04-16 ENCOUNTER — PATIENT OUTREACH (OUTPATIENT)
Dept: CASE MANAGEMENT | Facility: OTHER | Age: 65
End: 2020-04-16

## 2020-04-16 NOTE — OUTREACH NOTE
Care Coordination Note    RN-JAE spoke with Enedelia at [facility], pt still covered by Med A for skilled services.    Maria Rashid RN  Ambulatory     4/16/2020, 11:25

## 2020-04-20 ENCOUNTER — APPOINTMENT (OUTPATIENT)
Dept: CT IMAGING | Facility: HOSPITAL | Age: 65
End: 2020-04-20

## 2020-04-20 ENCOUNTER — HOSPITAL ENCOUNTER (EMERGENCY)
Facility: HOSPITAL | Age: 65
Discharge: NURSING, REHAB OR LONG-TERM CARE | End: 2020-04-20
Attending: EMERGENCY MEDICINE | Admitting: FAMILY MEDICINE

## 2020-04-20 ENCOUNTER — APPOINTMENT (OUTPATIENT)
Dept: GENERAL RADIOLOGY | Facility: HOSPITAL | Age: 65
End: 2020-04-20

## 2020-04-20 ENCOUNTER — APPOINTMENT (OUTPATIENT)
Dept: ULTRASOUND IMAGING | Facility: HOSPITAL | Age: 65
End: 2020-04-20

## 2020-04-20 VITALS
RESPIRATION RATE: 20 BRPM | WEIGHT: 137.44 LBS | BODY MASS INDEX: 23.46 KG/M2 | HEART RATE: 98 BPM | HEIGHT: 64 IN | OXYGEN SATURATION: 99 % | SYSTOLIC BLOOD PRESSURE: 98 MMHG | TEMPERATURE: 98.8 F | DIASTOLIC BLOOD PRESSURE: 65 MMHG

## 2020-04-20 DIAGNOSIS — N17.9 ACUTE RENAL FAILURE SUPERIMPOSED ON CHRONIC KIDNEY DISEASE, UNSPECIFIED CKD STAGE, UNSPECIFIED ACUTE RENAL FAILURE TYPE (HCC): ICD-10-CM

## 2020-04-20 DIAGNOSIS — K72.00 ACUTE LIVER FAILURE WITHOUT HEPATIC COMA: Primary | ICD-10-CM

## 2020-04-20 DIAGNOSIS — N18.9 ACUTE RENAL FAILURE SUPERIMPOSED ON CHRONIC KIDNEY DISEASE, UNSPECIFIED CKD STAGE, UNSPECIFIED ACUTE RENAL FAILURE TYPE (HCC): ICD-10-CM

## 2020-04-20 LAB
A-A DO2: 17.1 MMHG (ref 0–300)
ABO GROUP BLD: NORMAL
ALBUMIN SERPL-MCNC: 2.59 G/DL (ref 3.5–5.2)
ALBUMIN/GLOB SERPL: 0.5 G/DL
ALP SERPL-CCNC: 342 U/L (ref 39–117)
ALT SERPL W P-5'-P-CCNC: 46 U/L (ref 1–33)
AMMONIA BLD-SCNC: 62 UMOL/L (ref 11–51)
AMYLASE SERPL-CCNC: 36 U/L (ref 28–100)
ANION GAP SERPL CALCULATED.3IONS-SCNC: 15.4 MMOL/L (ref 5–15)
ANISOCYTOSIS BLD QL: NORMAL
APTT PPP: 64.5 SECONDS (ref 23.8–36.1)
ARTERIAL PATENCY WRIST A: POSITIVE
AST SERPL-CCNC: 247 U/L (ref 1–32)
ATMOSPHERIC PRESS: 720 MMHG
BACTERIA UR QL AUTO: ABNORMAL /HPF
BASE EXCESS BLDA CALC-SCNC: 2.5 MMOL/L (ref 0–2)
BASOPHILS # BLD AUTO: 0.05 10*3/MM3 (ref 0–0.2)
BASOPHILS NFR BLD AUTO: 0.6 % (ref 0–1.5)
BDY SITE: ABNORMAL
BILIRUB SERPL-MCNC: 12.6 MG/DL (ref 0.2–1.2)
BILIRUB UR QL STRIP: ABNORMAL
BLD GP AB SCN SERPL QL: NEGATIVE
BODY TEMPERATURE: 0 C
BUN BLD-MCNC: 35 MG/DL (ref 8–23)
BUN/CREAT SERPL: 29.4 (ref 7–25)
CALCIUM SPEC-SCNC: 9.5 MG/DL (ref 8.6–10.5)
CHLORIDE SERPL-SCNC: 123 MMOL/L (ref 98–107)
CLARITY UR: ABNORMAL
CO2 BLDA-SCNC: 28.1 MMOL/L (ref 22–33)
CO2 SERPL-SCNC: 25.6 MMOL/L (ref 22–29)
COHGB MFR BLD: 1 % (ref 0–5)
COLOR UR: ABNORMAL
CREAT BLD-MCNC: 1.19 MG/DL (ref 0.57–1)
CRP SERPL-MCNC: 15.03 MG/DL (ref 0–0.5)
D-LACTATE SERPL-SCNC: 2.2 MMOL/L (ref 0.5–2)
DEPRECATED RDW RBC AUTO: 88 FL (ref 37–54)
DIGOXIN SERPL-MCNC: 0.8 NG/ML (ref 0.6–1.2)
EOSINOPHIL # BLD AUTO: 0.05 10*3/MM3 (ref 0–0.4)
EOSINOPHIL NFR BLD AUTO: 0.6 % (ref 0.3–6.2)
ERYTHROCYTE [DISTWIDTH] IN BLOOD BY AUTOMATED COUNT: 22.5 % (ref 12.3–15.4)
FLUAV AG NPH QL: NEGATIVE
FLUBV AG NPH QL IA: NEGATIVE
GFR SERPL CREATININE-BSD FRML MDRD: 46 ML/MIN/1.73
GLOBULIN UR ELPH-MCNC: 5.3 GM/DL
GLUCOSE BLD-MCNC: 73 MG/DL (ref 65–99)
GLUCOSE UR STRIP-MCNC: NEGATIVE MG/DL
GRAN CASTS URNS QL MICRO: ABNORMAL /LPF
HCO3 BLDA-SCNC: 26.9 MMOL/L (ref 20–26)
HCT VFR BLD AUTO: 38.9 % (ref 34–46.6)
HCT VFR BLD CALC: 33.8 % (ref 38–51)
HGB BLD-MCNC: 11.7 G/DL (ref 12–15.9)
HGB BLDA-MCNC: 11 G/DL (ref 13.5–17.5)
HGB UR QL STRIP.AUTO: NEGATIVE
HOLD SPECIMEN: NORMAL
HOLD SPECIMEN: NORMAL
HOROWITZ INDEX BLD+IHG-RTO: 21 %
HYALINE CASTS UR QL AUTO: ABNORMAL /LPF
HYPOCHROMIA BLD QL: NORMAL
IMM GRANULOCYTES # BLD AUTO: 0.04 10*3/MM3 (ref 0–0.05)
IMM GRANULOCYTES NFR BLD AUTO: 0.5 % (ref 0–0.5)
INR PPP: 4.2 (ref 0.9–1.1)
KETONES UR QL STRIP: NEGATIVE
LACTATE HOLD SPECIMEN: NORMAL
LEUKOCYTE ESTERASE UR QL STRIP.AUTO: ABNORMAL
LIPASE SERPL-CCNC: 32 U/L (ref 13–60)
LYMPHOCYTES # BLD AUTO: 1.29 10*3/MM3 (ref 0.7–3.1)
LYMPHOCYTES NFR BLD AUTO: 16.5 % (ref 19.6–45.3)
Lab: ABNORMAL
MACROCYTES BLD QL SMEAR: NORMAL
MAGNESIUM SERPL-MCNC: 2.4 MG/DL (ref 1.6–2.4)
MCH RBC QN AUTO: 31.5 PG (ref 26.6–33)
MCHC RBC AUTO-ENTMCNC: 30.1 G/DL (ref 31.5–35.7)
MCV RBC AUTO: 104.9 FL (ref 79–97)
METHGB BLD QL: 0.5 % (ref 0–3)
MODALITY: ABNORMAL
MONOCYTES # BLD AUTO: 0.45 10*3/MM3 (ref 0.1–0.9)
MONOCYTES NFR BLD AUTO: 5.7 % (ref 5–12)
NEUTROPHILS # BLD AUTO: 5.95 10*3/MM3 (ref 1.7–7)
NEUTROPHILS NFR BLD AUTO: 76.1 % (ref 42.7–76)
NITRITE UR QL STRIP: POSITIVE
NOTE: ABNORMAL
NRBC BLD AUTO-RTO: 0 /100 WBC (ref 0–0.2)
NT-PROBNP SERPL-MCNC: 1870 PG/ML (ref 5–900)
OXYHGB MFR BLDV: 94.3 % (ref 94–99)
PCO2 BLDA: 40 MM HG (ref 35–45)
PCO2 TEMP ADJ BLD: ABNORMAL MM[HG]
PH BLDA: 7.43 PH UNITS (ref 7.35–7.45)
PH UR STRIP.AUTO: <=5 [PH] (ref 5–8)
PH, TEMP CORRECTED: ABNORMAL
PLAT MORPH BLD: NORMAL
PLATELET # BLD AUTO: 213 10*3/MM3 (ref 140–450)
PMV BLD AUTO: 12.1 FL (ref 6–12)
PO2 BLDA: 79 MM HG (ref 83–108)
PO2 TEMP ADJ BLD: ABNORMAL MM[HG]
POTASSIUM BLD-SCNC: 3.9 MMOL/L (ref 3.5–5.2)
PROT SERPL-MCNC: 7.9 G/DL (ref 6–8.5)
PROT UR QL STRIP: ABNORMAL
PROTHROMBIN TIME: 42.4 SECONDS (ref 11–15.4)
RBC # BLD AUTO: 3.71 10*6/MM3 (ref 3.77–5.28)
RBC # UR: ABNORMAL /HPF
REF LAB TEST METHOD: ABNORMAL
RH BLD: POSITIVE
SAO2 % BLDCOA: 95.7 % (ref 94–99)
SODIUM BLD-SCNC: 164 MMOL/L (ref 136–145)
SP GR UR STRIP: 1.03 (ref 1–1.03)
SQUAMOUS #/AREA URNS HPF: ABNORMAL /HPF
T&S EXPIRATION DATE: NORMAL
T4 FREE SERPL-MCNC: 1.89 NG/DL (ref 0.93–1.7)
TROPONIN T SERPL-MCNC: 0.02 NG/ML (ref 0–0.03)
TSH SERPL DL<=0.05 MIU/L-ACNC: 2.84 UIU/ML (ref 0.27–4.2)
UROBILINOGEN UR QL STRIP: ABNORMAL
VENTILATOR MODE: ABNORMAL
WBC NRBC COR # BLD: 7.83 10*3/MM3 (ref 3.4–10.8)
WBC UR QL AUTO: ABNORMAL /HPF
WHOLE BLOOD HOLD SPECIMEN: NORMAL
WHOLE BLOOD HOLD SPECIMEN: NORMAL

## 2020-04-20 PROCEDURE — 25010000002 VANCOMYCIN: Performed by: EMERGENCY MEDICINE

## 2020-04-20 PROCEDURE — 74176 CT ABD & PELVIS W/O CONTRAST: CPT | Performed by: RADIOLOGY

## 2020-04-20 PROCEDURE — 93010 ELECTROCARDIOGRAM REPORT: CPT | Performed by: INTERNAL MEDICINE

## 2020-04-20 PROCEDURE — 84484 ASSAY OF TROPONIN QUANT: CPT | Performed by: EMERGENCY MEDICINE

## 2020-04-20 PROCEDURE — 82375 ASSAY CARBOXYHB QUANT: CPT

## 2020-04-20 PROCEDURE — 83605 ASSAY OF LACTIC ACID: CPT | Performed by: EMERGENCY MEDICINE

## 2020-04-20 PROCEDURE — 76705 ECHO EXAM OF ABDOMEN: CPT

## 2020-04-20 PROCEDURE — 25010000002 PIPERACILLIN SOD-TAZOBACTAM PER 1 G: Performed by: EMERGENCY MEDICINE

## 2020-04-20 PROCEDURE — 80053 COMPREHEN METABOLIC PANEL: CPT | Performed by: EMERGENCY MEDICINE

## 2020-04-20 PROCEDURE — 70450 CT HEAD/BRAIN W/O DYE: CPT | Performed by: RADIOLOGY

## 2020-04-20 PROCEDURE — 86850 RBC ANTIBODY SCREEN: CPT | Performed by: FAMILY MEDICINE

## 2020-04-20 PROCEDURE — 71250 CT THORAX DX C-: CPT

## 2020-04-20 PROCEDURE — 85730 THROMBOPLASTIN TIME PARTIAL: CPT | Performed by: EMERGENCY MEDICINE

## 2020-04-20 PROCEDURE — 85007 BL SMEAR W/DIFF WBC COUNT: CPT | Performed by: EMERGENCY MEDICINE

## 2020-04-20 PROCEDURE — 87804 INFLUENZA ASSAY W/OPTIC: CPT | Performed by: EMERGENCY MEDICINE

## 2020-04-20 PROCEDURE — 83050 HGB METHEMOGLOBIN QUAN: CPT

## 2020-04-20 PROCEDURE — 83735 ASSAY OF MAGNESIUM: CPT | Performed by: EMERGENCY MEDICINE

## 2020-04-20 PROCEDURE — 71250 CT THORAX DX C-: CPT | Performed by: RADIOLOGY

## 2020-04-20 PROCEDURE — 84443 ASSAY THYROID STIM HORMONE: CPT | Performed by: EMERGENCY MEDICINE

## 2020-04-20 PROCEDURE — 71045 X-RAY EXAM CHEST 1 VIEW: CPT | Performed by: RADIOLOGY

## 2020-04-20 PROCEDURE — 93005 ELECTROCARDIOGRAM TRACING: CPT | Performed by: EMERGENCY MEDICINE

## 2020-04-20 PROCEDURE — 36600 WITHDRAWAL OF ARTERIAL BLOOD: CPT

## 2020-04-20 PROCEDURE — 82140 ASSAY OF AMMONIA: CPT | Performed by: EMERGENCY MEDICINE

## 2020-04-20 PROCEDURE — 83690 ASSAY OF LIPASE: CPT | Performed by: EMERGENCY MEDICINE

## 2020-04-20 PROCEDURE — P9612 CATHETERIZE FOR URINE SPEC: HCPCS

## 2020-04-20 PROCEDURE — 82805 BLOOD GASES W/O2 SATURATION: CPT

## 2020-04-20 PROCEDURE — 96367 TX/PROPH/DG ADDL SEQ IV INF: CPT

## 2020-04-20 PROCEDURE — 86900 BLOOD TYPING SEROLOGIC ABO: CPT | Performed by: FAMILY MEDICINE

## 2020-04-20 PROCEDURE — 80162 ASSAY OF DIGOXIN TOTAL: CPT | Performed by: EMERGENCY MEDICINE

## 2020-04-20 PROCEDURE — 87040 BLOOD CULTURE FOR BACTERIA: CPT | Performed by: EMERGENCY MEDICINE

## 2020-04-20 PROCEDURE — 85610 PROTHROMBIN TIME: CPT | Performed by: EMERGENCY MEDICINE

## 2020-04-20 PROCEDURE — 84439 ASSAY OF FREE THYROXINE: CPT | Performed by: EMERGENCY MEDICINE

## 2020-04-20 PROCEDURE — 83880 ASSAY OF NATRIURETIC PEPTIDE: CPT | Performed by: EMERGENCY MEDICINE

## 2020-04-20 PROCEDURE — 82150 ASSAY OF AMYLASE: CPT | Performed by: EMERGENCY MEDICINE

## 2020-04-20 PROCEDURE — 71045 X-RAY EXAM CHEST 1 VIEW: CPT

## 2020-04-20 PROCEDURE — 81001 URINALYSIS AUTO W/SCOPE: CPT | Performed by: EMERGENCY MEDICINE

## 2020-04-20 PROCEDURE — 86901 BLOOD TYPING SEROLOGIC RH(D): CPT | Performed by: FAMILY MEDICINE

## 2020-04-20 PROCEDURE — 85025 COMPLETE CBC W/AUTO DIFF WBC: CPT | Performed by: EMERGENCY MEDICINE

## 2020-04-20 PROCEDURE — 87086 URINE CULTURE/COLONY COUNT: CPT | Performed by: EMERGENCY MEDICINE

## 2020-04-20 PROCEDURE — 99285 EMERGENCY DEPT VISIT HI MDM: CPT

## 2020-04-20 PROCEDURE — 74176 CT ABD & PELVIS W/O CONTRAST: CPT

## 2020-04-20 PROCEDURE — 86140 C-REACTIVE PROTEIN: CPT | Performed by: EMERGENCY MEDICINE

## 2020-04-20 PROCEDURE — 70450 CT HEAD/BRAIN W/O DYE: CPT

## 2020-04-20 PROCEDURE — 96365 THER/PROPH/DIAG IV INF INIT: CPT

## 2020-04-20 RX ORDER — MIDAZOLAM HYDROCHLORIDE 1 MG/ML
4 INJECTION INTRAMUSCULAR; INTRAVENOUS ONCE
Status: DISCONTINUED | OUTPATIENT
Start: 2020-04-20 | End: 2020-04-20

## 2020-04-20 RX ORDER — NOREPINEPHRINE BIT/0.9 % NACL 8 MG/250ML
.02-.3 INFUSION BOTTLE (ML) INTRAVENOUS
Status: DISCONTINUED | OUTPATIENT
Start: 2020-04-20 | End: 2020-04-20 | Stop reason: HOSPADM

## 2020-04-20 RX ORDER — SODIUM CHLORIDE 9 MG/ML
125 INJECTION, SOLUTION INTRAVENOUS CONTINUOUS
Status: DISCONTINUED | OUTPATIENT
Start: 2020-04-20 | End: 2020-04-20

## 2020-04-20 RX ORDER — DEXTROSE AND SODIUM CHLORIDE 5; .2 G/100ML; G/100ML
150 INJECTION, SOLUTION INTRAVENOUS CONTINUOUS
Status: DISCONTINUED | OUTPATIENT
Start: 2020-04-20 | End: 2020-04-20 | Stop reason: HOSPADM

## 2020-04-20 RX ORDER — SODIUM CHLORIDE 0.9 % (FLUSH) 0.9 %
10 SYRINGE (ML) INJECTION AS NEEDED
Status: DISCONTINUED | OUTPATIENT
Start: 2020-04-20 | End: 2020-04-20 | Stop reason: HOSPADM

## 2020-04-20 RX ADMIN — SODIUM CHLORIDE 1869 ML: 9 INJECTION, SOLUTION INTRAVENOUS at 18:06

## 2020-04-20 RX ADMIN — VANCOMYCIN HYDROCHLORIDE 1250 MG: 10 INJECTION, POWDER, LYOPHILIZED, FOR SOLUTION INTRAVENOUS at 20:17

## 2020-04-20 RX ADMIN — PIPERACILLIN SODIUM AND TAZOBACTAM SODIUM 3.38 G: 3; .375 INJECTION, POWDER, LYOPHILIZED, FOR SOLUTION INTRAVENOUS at 19:08

## 2020-04-20 RX ADMIN — DEXTROSE AND SODIUM CHLORIDE 150 ML/HR: 5; 200 INJECTION, SOLUTION INTRAVENOUS at 20:17

## 2020-04-20 NOTE — ED NOTES
Per verbal order Dr. Calero called on call guardianship spoke with Alyssa Robertson who gave verbal permission to infuse blood products and place central line.     Obdulia Deluna RN  04/20/20 1954

## 2020-04-20 NOTE — ED PROVIDER NOTES
Subjective   Patient is a 64-year-old female sent from a local nursing home for evaluation.  They report jaundice in the setting of hepatitis B and hepatitis C, also hypotension with a blood pressure of 80/50.  Patient has a history of stroke with aphasia and hemiplegia.  She is unable to provide any history.          Review of Systems   Unable to perform ROS: Patient nonverbal       Past Medical History:   Diagnosis Date   • Aphasia    • Atrial fibrillation (CMS/HCC)    • Dysphagia    • Hemiplegia (CMS/HCC)    • Hypertension    • Seizures (CMS/HCC)    • Traumatic intracerebral hemorrhage (CMS/HCC)        No Known Allergies    Past Surgical History:   Procedure Laterality Date   • FEMUR IM NAILING RETROGRADE Left 3/7/2020    Procedure: FEMUR INTRAMEDULLARY NAILING RETROGRADE;  Surgeon: Modesto Dumont MD;  [facility];  Service: Orthopedics;  Laterality: Left;       No family history on file.    Social History     Socioeconomic History   • Marital status: Single     Spouse name: Not on file   • Number of children: Not on file   • Years of education: Not on file   • Highest education level: Not on file   Tobacco Use   • Smoking status: Never Smoker   • Smokeless tobacco: Never Used   Substance and Sexual Activity   • Alcohol use: Not Currently     Comment: unknown, current nursing home resident    • Drug use: Not Currently     Comment: unknown    • Sexual activity: Not Currently           Objective   Physical Exam   Constitutional:   A chronically ill-appearing female who is in no apparent acute distress.  She is jaundiced.   HENT:   Head: Normocephalic and atraumatic.   Eyes: Pupils are equal, round, and reactive to light. EOM are normal.   Neck: Normal range of motion. Neck supple. No neck rigidity. No tracheal deviation present.   Cardiovascular: Intact distal pulses.   Irregularly irregular, tachycardic   Pulmonary/Chest: Effort normal and breath sounds normal. No respiratory distress. She exhibits no  tenderness.   Abdominal: Soft. Bowel sounds are normal. There is no tenderness. There is no rebound and no guarding.   Musculoskeletal: She exhibits no tenderness.   Neurological:   Patient is awake.  Patient is aphasic.  She does not answer questions, she does not follow commands.  There is left-sided hemiparesis consistent with her history.  There are no focal neurological deficits that appear to be acute.   Skin: Skin is warm and dry. No cyanosis. No pallor.   Psychiatric: She has a normal mood and affect. Her behavior is normal.   Nursing note and vitals reviewed.      Procedures  EKG shows atrial fibrillation with a rate of 108.  There are diffuse ST-T abnormalities, inferiorly, anterolaterally.  Similar anterolateral changes were noted 3/8/2020.  The inferior changes were not noted on the previous tracing.         ED Course  ED Course as of Apr 20 2241   Mon Apr 20, 2020   1839 I discussed the patient and her chemistry panel with Dr. Cox.  He advises to finish the 30 cc/kg bolus of normal saline, then change her fluids to D5 quarter normal saline at 150 cc/h.  He advises that his partner Dr. Paul will be seeing the patient in consultation.    [CM]   1852 Endorsed to Dr. Calero at this time, with CT scans and ammonia level pending.    [CM]   1900 Patient seen and examined.  Patient is noted to be jaundiced.  Patient has had a midline inserted with ultrasound guidance as well as has another large-bore IV patient with currently 2 IVs placed.    [BB]   1943 Patient is noted to have abnormal coags will order FFP prior to doing central line placement.    [BB]   1945 Have contacted state guardian who is agreeable for patient to receive central line as well as to receive blood products.    [BB]   1946 Ct Abdomen Pelvis Without Contrast    Result Date: 4/20/2020  : 1. Small volume ascites 2. Bibasilar pleural effusions and left basilar nodules 3. Other findings as above     This report was finalized on 4/20/2020 7:36  PM by Dr. Ric Shipley MD.      Ct Head Without Contrast    Result Date: 4/20/2020  No acute intracranial pathology. Nothing is seen on this exam to specifically account for the patient's symptoms.  This report was finalized on 4/20/2020 7:43 PM by Dr. Ric Shipley MD.      Ct Chest Without Contrast    Result Date: 4/20/2020   1. There are multiple parenchymal nodules in both lungs compatible with metastatic disease 2. Bilateral effusions, right greater than left  This report was finalized on 4/20/2020 7:42 PM by Dr. Ric Shipley MD.      Xr Chest 1 View    Result Date: 4/20/2020  CHF and equivocal left-sided pneumonia  This report was finalized on 4/20/2020 6:56 PM by Dr. Ric Shipley MD.          [BB]   2007 Blood pressure improved 116/66 we will continue to monitor patient.    [BB]   2020 Patient's blood pressure 103/67 heart rate is 110.  Have spoken to hospitalist who feels that in light of patient's hepatorenal failure would benefit from transfer for higher level care facility where GI support is available have contacted  MDs awaiting callback.    [BB]   2025 Have spoken to Dr. Loo with  who is agreeable to accept transfer    [BB]   2124 Patient was noted to have loss of IV access however patient's blood pressure is currently 90/65 another IV has been established patient is to 20-gauge IVs that returned blood as well as flushed easily.  Patient will have Levophed run through peripheral line temporarily has patient has yet to receive FFP at the time of this dictation.  Given patient's elevated INR concern for hemorrhage with central line placement is concerned therefore will temporarily run Levophed while patient is transferred to     [BB]      ED Course User Index  [BB] Paul Calero MD  [CM] Nilay Uribe MD                                           [facility]    Final diagnoses:   Acute liver failure without hepatic coma   Acute renal failure superimposed on chronic kidney disease,  unspecified CKD stage, unspecified acute renal failure type (CMS/[facility])            Paul Calero MD  04/20/20 3687

## 2020-04-21 LAB — BACTERIA SPEC AEROBE CULT: NO GROWTH

## 2020-04-21 NOTE — ED NOTES
Patient being transferred to [facility] at this time via air evac; patient remains alert but nonverbal; IV's remain intact; Levophed sent with air evac; /55 at this time; dextrose 5% and sodium chloride 0.2% infusing; VSS; NAD noted; respirations even and unlabored     Jack Braden, RN  04/20/20 7286

## 2020-04-21 NOTE — ED NOTES
Spoke with Alyssa Robertson at after hours state guardianship she gave consent to transfer to      Jack Braden, RN  04/20/20 2038

## 2020-04-21 NOTE — ED NOTES
Received report from XUAN Perry; patient resting on stretcher; NAD Noted; patient alert and disoriented X4; respirations even and unlabored; patient remains hypotensive, Dr. Calero aware; will continue to monitor     Jack Braden, XUAN  04/20/20 2036

## 2020-04-23 ENCOUNTER — PATIENT OUTREACH (OUTPATIENT)
Dept: CASE MANAGEMENT | Facility: OTHER | Age: 65
End: 2020-04-23

## 2020-04-23 NOTE — OUTREACH NOTE
Care Coordination Note    RN-JAE spoke with Enedelia at [facility], patient is no longer covered my Medicare A, pt now covered by Medicaid for LTC.    Maria Rashid RN  Ambulatory     4/23/2020, 15:08

## 2020-04-25 LAB
BACTERIA SPEC AEROBE CULT: NORMAL
BACTERIA SPEC AEROBE CULT: NORMAL

## 2020-05-04 ENCOUNTER — TELEPHONE (OUTPATIENT)
Dept: FAMILY MEDICINE CLINIC | Facility: CLINIC | Age: 65
End: 2020-05-04

## 2020-05-04 NOTE — TELEPHONE ENCOUNTER
I agree that she has a terminal diagnosis and qualifies for hospice care.  Please call them and if I need to speak to them personally I will.

## 2020-05-04 NOTE — TELEPHONE ENCOUNTER
[facility] CALLED AND STATED THAT PT IS BEING MOVED FROM  TO [facility]    PT IS NEEDING TO BE CERTIFIED BY DR. BURT THAT SHE IS TERMINAL FOR HOSPICE    PLEASE CALL AND GIVE A VERBAL ORDER -[phone] TO SPEAK TO LUCIEN

## 2020-05-05 ENCOUNTER — OUTSIDE FACILITY SERVICE (OUTPATIENT)
Dept: FAMILY MEDICINE CLINIC | Facility: CLINIC | Age: 65
End: 2020-05-05

## 2020-05-05 PROCEDURE — 99310 SBSQ NF CARE HIGH MDM 45: CPT | Performed by: FAMILY MEDICINE

## (undated) DEVICE — BIT DRL 3FLUT QC CALIB 4.2X330X100MM STRL

## (undated) DEVICE — STRAP POSTN KN/BDY FM 5X72IN DISP

## (undated) DEVICE — BNDG ELAS ELITE V/CLOSE 6IN 5YD LF STRL

## (undated) DEVICE — Device

## (undated) DEVICE — PROXIMATE RH ROTATING HEAD SKIN STAPLERS (35 WIDE) CONTAINS 35 STAINLESS STEEL STAPLES: Brand: PROXIMATE

## (undated) DEVICE — BNDG ELAS CO-FLEX SLF ADHR 4IN5YD LF STRL

## (undated) DEVICE — DRSNG PAD ABD 8X10IN STRL

## (undated) DEVICE — TWIST DRILL FOR COLE RADIOLUCENT                                    DRILL 4.0MM: Brand: RUSSELL-TAYLOR

## (undated) DEVICE — GLV SURG TRIUMPH ORTHO W/ALOE PF LTX 8 STRL

## (undated) DEVICE — PAD POSITIONING TRIANGLES KN DISP STRL

## (undated) DEVICE — DRP C/ARM W/BAND W/CLIPS 41X74IN

## (undated) DEVICE — DRSNG WND GZ CURAD OIL EMULSION 3X8IN LF STRL 1PK

## (undated) DEVICE — SPNG GZ STRL 2S 4X4 12PLY

## (undated) DEVICE — DRAPE,U/ SHT,SPLIT,PLAS,STERIL: Brand: MEDLINE

## (undated) DEVICE — PK EXTREM LOWR 70

## (undated) DEVICE — GW 3.2X290MM